# Patient Record
Sex: FEMALE | Race: WHITE | Employment: FULL TIME | URBAN - METROPOLITAN AREA
[De-identification: names, ages, dates, MRNs, and addresses within clinical notes are randomized per-mention and may not be internally consistent; named-entity substitution may affect disease eponyms.]

---

## 2017-03-16 ENCOUNTER — OFFICE VISIT (OUTPATIENT)
Dept: OBGYN CLINIC | Facility: CLINIC | Age: 57
End: 2017-03-16

## 2017-03-16 VITALS
HEART RATE: 63 BPM | WEIGHT: 101.81 LBS | SYSTOLIC BLOOD PRESSURE: 112 MMHG | DIASTOLIC BLOOD PRESSURE: 61 MMHG | BODY MASS INDEX: 19 KG/M2

## 2017-03-16 DIAGNOSIS — Z12.4 SCREENING FOR MALIGNANT NEOPLASM OF CERVIX: ICD-10-CM

## 2017-03-16 DIAGNOSIS — Z85.3 HISTORY OF BREAST CANCER: ICD-10-CM

## 2017-03-16 DIAGNOSIS — Z01.419 ENCOUNTER FOR GYNECOLOGICAL EXAMINATION: Primary | ICD-10-CM

## 2017-03-16 PROCEDURE — 99396 PREV VISIT EST AGE 40-64: CPT | Performed by: OBSTETRICS & GYNECOLOGY

## 2017-03-16 NOTE — PROGRESS NOTES
Brittney Egan is a 49year old female  No LMP recorded. Patient is not currently having periods (Reason: Chemo). here for annual exam.       Last seen 14. Last pap 2014 normal with neg HPV. Seen Newton Rocha in .     Was diagnosed with left b fatigue, night sweats, hot flashes  Eyes:  denies blurred or double vision  Cardiovascular:  denies chest pain or palpitations  Respiratory:  denies shortness of breath  Gastrointestinal:  denies heartburn, abdominal pain, diarrhea or constipation  Genitou exams encouraged. RTC 1 year or prn    2.  History of breast cancer  Pt will f/u with oncologist/ imaging      No prescriptions requested or ordered in this encounter      Martell Dailey MD  3/16/2017  12:39 PM

## 2017-03-17 LAB — HPV I/H RISK 1 DNA SPEC QL NAA+PROBE: NEGATIVE

## 2017-06-13 ENCOUNTER — TELEPHONE (OUTPATIENT)
Dept: OBGYN CLINIC | Facility: CLINIC | Age: 57
End: 2017-06-13

## 2017-06-13 NOTE — TELEPHONE ENCOUNTER
Pt requesting mammogram order. Pt saw Maycol Hope in March for annual. Per notes, pt was following up with rad/onc for mammograms d/t hx of breast cancer. Pt states she spoke to her oncologist and was told it's \"OK for ANDERK to order a basim diagnostic 3D mammo\".

## 2017-06-14 NOTE — TELEPHONE ENCOUNTER
Message below states pt returning call leave a voice message at [phone]. LM at [phone]. LM that  [facility] stated the below. [facility] does not feel comfortable giving order for mammogram since she may also need breast MRI.   Informed pt by leaving the mes

## 2017-06-14 NOTE — TELEPHONE ENCOUNTER
Pt was diagnosed with breast cancer in 2014. Pt was seeing radiation oncologist.  She should be seeing medical oncologist instead. Do not feel comfortable giving order for mammogram since she may also need breast MRI.   If she does not have medical oncolog

## 2017-06-15 ENCOUNTER — TELEPHONE (OUTPATIENT)
Dept: HEMATOLOGY/ONCOLOGY | Facility: HOSPITAL | Age: 57
End: 2017-06-15

## 2017-06-15 NOTE — TELEPHONE ENCOUNTER
Patient just saw Dr. Rodriguez Costa , she did not feel comforatable as to what type of Mammogram should be done so suggested patient have Dr. Manning [facility] order it. If she needs to see him she can make appt.    [phone]    She is thinking about establishing care with

## 2017-07-01 ENCOUNTER — APPOINTMENT (OUTPATIENT)
Dept: RADIATION ONCOLOGY | Facility: HOSPITAL | Age: 57
End: 2017-07-01
Attending: RADIOLOGY
Payer: COMMERCIAL

## 2017-07-05 ENCOUNTER — TELEPHONE (OUTPATIENT)
Dept: HEMATOLOGY/ONCOLOGY | Facility: HOSPITAL | Age: 57
End: 2017-07-05

## 2017-07-21 ENCOUNTER — OFFICE VISIT (OUTPATIENT)
Dept: RADIATION ONCOLOGY | Facility: HOSPITAL | Age: 57
End: 2017-07-21
Attending: RADIOLOGY
Payer: COMMERCIAL

## 2017-07-21 VITALS
RESPIRATION RATE: 16 BRPM | DIASTOLIC BLOOD PRESSURE: 51 MMHG | HEART RATE: 91 BPM | BODY MASS INDEX: 17.29 KG/M2 | SYSTOLIC BLOOD PRESSURE: 116 MMHG | HEIGHT: 62.5 IN | WEIGHT: 96.38 LBS

## 2017-07-21 DIAGNOSIS — C50.412 MALIGNANT NEOPLASM OF UPPER-OUTER QUADRANT OF LEFT BREAST IN FEMALE, ESTROGEN RECEPTOR POSITIVE (HCC): Primary | ICD-10-CM

## 2017-07-21 DIAGNOSIS — Z17.0 MALIGNANT NEOPLASM OF UPPER-OUTER QUADRANT OF LEFT BREAST IN FEMALE, ESTROGEN RECEPTOR POSITIVE (HCC): Primary | ICD-10-CM

## 2017-07-21 PROCEDURE — 99211 OFF/OP EST MAY X REQ PHY/QHP: CPT

## 2017-07-21 NOTE — PROGRESS NOTES
Pt seen in follow up with Dr dAi Lucia, having completed radiation to the L breast 9/4/15. Pt had been following at [facility] and prefers to have all follow up at [address] [place] [address].  Pt to make an appointment with Dr Tonio Joseph for 1st available as pt stopped her tamoxifen almost 2 ye

## 2017-07-27 NOTE — PROGRESS NOTES
[facility]    PATIENT'S NAME: Saud Bauer   RADIATION ONCOLOGIST: Josi Hebert.  Carolene Peabody, MD   PATIENT ACCOUNT #: [de-identified] LOCATION: [address] [place] [address] RECORD #: [id number] YOB: 1960   FOLLOW-UP DATE: 07/21/2017       RADIATIO and has only some occasional tenderness, which is not particularly substantial.  Her appetite is good, and her energy level is stable. She had been placed on tamoxifen but stopped this medication quite some time ago due to some side effects.       Her most may be able to try an aromatase inhibitor at this point as she may be postmenopausal.  The side effects from this may not be as bad as tamoxifen for her, but I do feel that this would be a worthwhile evaluation.   As for the MRI, which is long since overdue

## 2017-08-07 ENCOUNTER — HOSPITAL ENCOUNTER (OUTPATIENT)
Dept: MAMMOGRAPHY | Facility: HOSPITAL | Age: 57
Discharge: HOME | End: 2017-08-07
Attending: RADIOLOGY
Payer: COMMERCIAL

## 2017-08-07 DIAGNOSIS — C50.412 MALIGNANT NEOPLASM OF UPPER-OUTER QUADRANT OF LEFT BREAST IN FEMALE, ESTROGEN RECEPTOR POSITIVE (HCC): ICD-10-CM

## 2017-08-07 DIAGNOSIS — Z17.0 MALIGNANT NEOPLASM OF UPPER-OUTER QUADRANT OF LEFT BREAST IN FEMALE, ESTROGEN RECEPTOR POSITIVE (HCC): ICD-10-CM

## 2017-08-07 PROCEDURE — 77066 DX MAMMO INCL CAD BI: CPT | Performed by: RADIOLOGY

## 2017-08-07 PROCEDURE — 77062 BREAST TOMOSYNTHESIS BI: CPT | Performed by: RADIOLOGY

## 2017-08-10 ENCOUNTER — APPOINTMENT (OUTPATIENT)
Dept: HEMATOLOGY/ONCOLOGY | Facility: HOSPITAL | Age: 57
End: 2017-08-10
Payer: COMMERCIAL

## 2017-08-29 ENCOUNTER — OFFICE VISIT (OUTPATIENT)
Dept: HEMATOLOGY/ONCOLOGY | Facility: HOSPITAL | Age: 57
End: 2017-08-29
Attending: INTERNAL MEDICINE
Payer: COMMERCIAL

## 2017-08-29 VITALS
TEMPERATURE: 99 F | HEART RATE: 66 BPM | DIASTOLIC BLOOD PRESSURE: 64 MMHG | BODY MASS INDEX: 18.12 KG/M2 | WEIGHT: 101 LBS | SYSTOLIC BLOOD PRESSURE: 113 MMHG | RESPIRATION RATE: 16 BRPM | HEIGHT: 62.5 IN

## 2017-08-29 DIAGNOSIS — C50.012 MALIGNANT NEOPLASM OF NIPPLE OF LEFT BREAST IN FEMALE, ESTROGEN RECEPTOR POSITIVE (HCC): Primary | ICD-10-CM

## 2017-08-29 DIAGNOSIS — Z17.0 MALIGNANT NEOPLASM OF NIPPLE OF LEFT BREAST IN FEMALE, ESTROGEN RECEPTOR POSITIVE (HCC): Primary | ICD-10-CM

## 2017-08-29 DIAGNOSIS — Z78.0 POST-MENOPAUSAL: ICD-10-CM

## 2017-08-29 PROCEDURE — 99215 OFFICE O/P EST HI 40 MIN: CPT | Performed by: INTERNAL MEDICINE

## 2017-08-29 PROCEDURE — 99212 OFFICE O/P EST SF 10 MIN: CPT | Performed by: INTERNAL MEDICINE

## 2017-08-29 RX ORDER — ANASTROZOLE 1 MG/1
1 TABLET ORAL DAILY
Qty: 30 TABLET | Refills: 6 | Status: SHIPPED | OUTPATIENT
Start: 2017-08-29 | End: 2019-01-07

## 2017-08-29 NOTE — PROGRESS NOTES
HPI    Tory Cody is a 49year old here for follow up of Malignant neoplasm of nipple of left breast in female, estrogen receptor positive (hcc)  (primary encounter diagnosis)    57y/o diagnosed L breast cancer.  Dr Adrian Meek, the pt's gynecologist palpated Neurological: Negative for dizziness, tremors, seizures, syncope, speech difficulty, weakness, light-headedness, numbness and headaches. Hematological: Negative for adenopathy. Does not bruise/bleed easily.    Psychiatric/Behavioral: Positive for sleep di • Cancer Paternal Grandfather 79     lung ca, smoker; d.75   • Breast Cancer Self 54         PHYSICAL EXAM:    /64 (BP Location: Left arm, Patient Position: Sitting, Cuff Size: adult)   Pulse 66   Temp 99 °F (37.2 °C) (Oral)   Resp 16   Ht 1.588 m (5 Neurological: She is alert and oriented to person, place, and time. She has normal reflexes. No cranial nerve deficit. Skin: Skin is warm and dry. No rash noted. She is not diaphoretic. No erythema. No pallor.    Psychiatric: She has a normal mood and aff No results found for this or any previous visit (from the past 48 hour(s)). Meds This Visit:        Imaging & Referrals:  None   No orders of the defined types were placed in this encounter.       PROCEDURE:            KOREY LAZARA 2D+3D DIAG KOREY W/CAD BILAT Dictated by (CST): Mitchell Dunham MD on 8/07/2017 at 10:35       Approved by (CST): Mitchell Dunham MD on 8/07/2017 at 10:49          =====  CONCLUSION:       BI-RADS CATEGORY:     DIAGNOSTIC CATEGORY 2--BENIGN FINDING:

## 2017-08-29 NOTE — PATIENT INSTRUCTIONS
Anastrozole tablets  What is this medicine? ANASTROZOLE (an AS troe zole) is used to treat breast cancer in women who have gone through menopause.  Some types of breast cancer depend on estrogen to grow, and this medicine can stop tumor growth by blockin Store at room temperature between 20 and 25 degrees C (68 and 77 degrees F). Throw away any unused medicine after the expiration date. What should I tell my health care provider before I take this medicine?   They need to know if you have any of these cond

## 2017-09-12 ENCOUNTER — HOSPITAL ENCOUNTER (OUTPATIENT)
Dept: BONE DENSITY | Age: 57
Discharge: HOME | End: 2017-09-12
Attending: INTERNAL MEDICINE
Payer: COMMERCIAL

## 2017-09-12 DIAGNOSIS — Z78.0 POST-MENOPAUSAL: ICD-10-CM

## 2017-09-12 PROCEDURE — 77080 DXA BONE DENSITY AXIAL: CPT | Performed by: INTERNAL MEDICINE

## 2017-09-19 ENCOUNTER — PATIENT MESSAGE (OUTPATIENT)
Dept: HEMATOLOGY/ONCOLOGY | Facility: HOSPITAL | Age: 57
End: 2017-09-19

## 2017-09-19 DIAGNOSIS — M85.89 OSTEOPENIA OF MULTIPLE SITES: Primary | ICD-10-CM

## 2017-09-20 PROBLEM — M85.89 OSTEOPENIA OF MULTIPLE SITES: Status: ACTIVE | Noted: 2017-09-20

## 2017-09-20 NOTE — TELEPHONE ENCOUNTER
From: Rachel Ambrocio  To: Dragan Lewis MD  Sent: 9/19/2017 6:27 PM CDT  Subject: Test Results Question    I had a bone density test on 9/12/17 and I was wondering when the results will be available.

## 2018-02-27 ENCOUNTER — APPOINTMENT (OUTPATIENT)
Dept: HEMATOLOGY/ONCOLOGY | Facility: HOSPITAL | Age: 58
End: 2018-02-27
Attending: INTERNAL MEDICINE
Payer: COMMERCIAL

## 2018-06-19 DIAGNOSIS — Z80.3 FAMILY HISTORY OF MALIGNANT NEOPLASM OF BREAST: ICD-10-CM

## 2018-06-19 DIAGNOSIS — Z17.0 MALIGNANT NEOPLASM OF NIPPLE OF LEFT BREAST IN FEMALE, ESTROGEN RECEPTOR POSITIVE (HCC): Primary | ICD-10-CM

## 2018-06-19 DIAGNOSIS — C50.012 MALIGNANT NEOPLASM OF NIPPLE OF LEFT BREAST IN FEMALE, ESTROGEN RECEPTOR POSITIVE (HCC): Primary | ICD-10-CM

## 2018-08-14 ENCOUNTER — HOSPITAL ENCOUNTER (OUTPATIENT)
Dept: MAMMOGRAPHY | Facility: HOSPITAL | Age: 58
Discharge: HOME | End: 2018-08-14
Attending: INTERNAL MEDICINE
Payer: COMMERCIAL

## 2018-08-14 ENCOUNTER — HOSPITAL ENCOUNTER (OUTPATIENT)
Dept: MAMMOGRAPHY | Facility: HOSPITAL | Age: 58
Discharge: HOME | End: 2018-08-14
Attending: RADIOLOGY
Payer: COMMERCIAL

## 2018-08-14 DIAGNOSIS — C50.012 MALIGNANT NEOPLASM OF NIPPLE OF LEFT BREAST IN FEMALE, ESTROGEN RECEPTOR POSITIVE (HCC): ICD-10-CM

## 2018-08-14 DIAGNOSIS — Z17.0 MALIGNANT NEOPLASM OF NIPPLE OF LEFT BREAST IN FEMALE, ESTROGEN RECEPTOR POSITIVE (HCC): ICD-10-CM

## 2018-08-14 DIAGNOSIS — Z17.0 MALIGNANT NEOPLASM OF NIPPLE OF LEFT BREAST IN FEMALE, ESTROGEN RECEPTOR POSITIVE (HCC): Primary | ICD-10-CM

## 2018-08-14 DIAGNOSIS — Z80.3 FAMILY HISTORY OF MALIGNANT NEOPLASM OF BREAST: ICD-10-CM

## 2018-08-14 DIAGNOSIS — C50.012 MALIGNANT NEOPLASM OF NIPPLE OF LEFT BREAST IN FEMALE, ESTROGEN RECEPTOR POSITIVE (HCC): Primary | ICD-10-CM

## 2018-08-14 PROCEDURE — 77062 BREAST TOMOSYNTHESIS BI: CPT | Performed by: INTERNAL MEDICINE

## 2018-08-14 PROCEDURE — 77066 DX MAMMO INCL CAD BI: CPT | Performed by: INTERNAL MEDICINE

## 2018-08-14 PROCEDURE — 76642 ULTRASOUND BREAST LIMITED: CPT | Performed by: INTERNAL MEDICINE

## 2018-08-16 ENCOUNTER — OFFICE VISIT (OUTPATIENT)
Dept: RADIATION ONCOLOGY | Facility: HOSPITAL | Age: 58
End: 2018-08-16
Attending: RADIOLOGY
Payer: COMMERCIAL

## 2018-08-16 VITALS
TEMPERATURE: 98 F | HEIGHT: 63 IN | BODY MASS INDEX: 18.18 KG/M2 | RESPIRATION RATE: 16 BRPM | SYSTOLIC BLOOD PRESSURE: 122 MMHG | WEIGHT: 102.63 LBS | DIASTOLIC BLOOD PRESSURE: 55 MMHG | HEART RATE: 62 BPM

## 2018-08-16 DIAGNOSIS — C50.412 MALIGNANT NEOPLASM OF UPPER-OUTER QUADRANT OF LEFT BREAST IN FEMALE, ESTROGEN RECEPTOR POSITIVE (HCC): Primary | ICD-10-CM

## 2018-08-16 DIAGNOSIS — Z17.0 MALIGNANT NEOPLASM OF UPPER-OUTER QUADRANT OF LEFT BREAST IN FEMALE, ESTROGEN RECEPTOR POSITIVE (HCC): Primary | ICD-10-CM

## 2018-08-16 PROCEDURE — 99211 OFF/OP EST MAY X REQ PHY/QHP: CPT

## 2018-08-16 NOTE — PROGRESS NOTES
Pt seen in annual follow up with Dr Ganesh Mendoza, having completed radiation to the L breast 9/4/15. Bilateral mammogram and US from 8/14/18 available for MD review.  Pt states she will no longer follow with Dr Danny Suarez, and is requesting a new medical oncologist.

## 2018-08-20 ENCOUNTER — TELEPHONE (OUTPATIENT)
Dept: HEMATOLOGY/ONCOLOGY | Facility: HOSPITAL | Age: 58
End: 2018-08-20

## 2018-08-20 NOTE — TELEPHONE ENCOUNTER
----- Message from Joey Kennedy RN sent at 8/20/2018 11:40 AM CDT -----  Please call pt and schedule her yearly f/u with Dr. Allison Stubbs.     Fernando Ellis  ----- Message -----  From: Pippa Metcalf MD  Sent: 8/20/2018   2:22 AM  To: Joey Kennedy RN    Patient needs

## 2018-08-27 ENCOUNTER — TELEPHONE (OUTPATIENT)
Dept: HEMATOLOGY/ONCOLOGY | Facility: HOSPITAL | Age: 58
End: 2018-08-27

## 2018-08-27 NOTE — TELEPHONE ENCOUNTER
Left a message asking the patient to call back and schedule yearly follow up or to let us know if she is seeing another physician some where else.

## 2018-08-28 NOTE — TELEPHONE ENCOUNTER
Bautista Douglas stated, she is not interested in scheduling with Dr Nina Miller. Bautista Douglas is seeing another hematologist here and will call us back to schedule.

## 2018-08-28 NOTE — PROGRESS NOTES
[facility]    PATIENT'S NAME: Gage Pablo   RADIATION ONCOLOGIST: Randall Eli.  Alcon Flores MD   PATIENT ACCOUNT #: [de-identified] LOCATION: [address] [place] [address] RECORD #: [id number] YOB: 1960   FOLLOW-UP DATE: 08/16/2018       RADIATIO having a lot of difficulties and stress due to having lost her job and some family disputes, but she apparently is in a better frame of mind at this point.   She had been given anastrozole from Dr. Gladys Zapata but never started this medication and feels that she [facility] you very much for allowing me the opportunity to participate in the care of this patient. If there should be questions regarding the radiotherapy, please feel free to contact me at any time. Dictated By Violeta Forbes MD  d: 08/28/2018 11:04:22  t

## 2018-09-12 ENCOUNTER — APPOINTMENT (OUTPATIENT)
Dept: HEMATOLOGY/ONCOLOGY | Facility: HOSPITAL | Age: 58
End: 2018-09-12
Attending: INTERNAL MEDICINE
Payer: COMMERCIAL

## 2018-09-19 ENCOUNTER — APPOINTMENT (OUTPATIENT)
Dept: HEMATOLOGY/ONCOLOGY | Facility: HOSPITAL | Age: 58
End: 2018-09-19
Attending: INTERNAL MEDICINE
Payer: COMMERCIAL

## 2018-10-03 ENCOUNTER — OFFICE VISIT (OUTPATIENT)
Dept: HEMATOLOGY/ONCOLOGY | Facility: HOSPITAL | Age: 58
End: 2018-10-03
Attending: INTERNAL MEDICINE
Payer: COMMERCIAL

## 2018-10-03 VITALS
WEIGHT: 102 LBS | BODY MASS INDEX: 18.07 KG/M2 | SYSTOLIC BLOOD PRESSURE: 114 MMHG | HEIGHT: 63 IN | HEART RATE: 65 BPM | DIASTOLIC BLOOD PRESSURE: 56 MMHG | TEMPERATURE: 99 F | RESPIRATION RATE: 16 BRPM

## 2018-10-03 DIAGNOSIS — Z78.0 ASYMPTOMATIC MENOPAUSAL STATE: Primary | ICD-10-CM

## 2018-10-03 DIAGNOSIS — M85.89 OSTEOPENIA OF MULTIPLE SITES: ICD-10-CM

## 2018-10-03 DIAGNOSIS — Z85.3 HISTORY OF BREAST CANCER: ICD-10-CM

## 2018-10-03 PROCEDURE — 99215 OFFICE O/P EST HI 40 MIN: CPT | Performed by: INTERNAL MEDICINE

## 2018-10-31 ENCOUNTER — TELEPHONE (OUTPATIENT)
Dept: HEMATOLOGY/ONCOLOGY | Facility: HOSPITAL | Age: 58
End: 2018-10-31

## 2018-10-31 NOTE — TELEPHONE ENCOUNTER
I attempted to reach Caridad Koehler. No answer. I left a voice mail message to please return my call so I may do a telephone assessment.

## 2018-10-31 NOTE — TELEPHONE ENCOUNTER
Dom Artist calling to inform Dr Stephanie Sclaes she started her anastrozole. She was having side effects from medication she was having heart palpitations 24 hrs a day, disoriented got lost going to work twice, headaches. She has patel parkinsons disease.  Patient has been

## 2018-11-01 NOTE — TELEPHONE ENCOUNTER
I made a second attempt to reach Romie Oliviaanjum regarding her sick call yesterday. No answer. I left a  msg asking her to please return my call.

## 2018-11-06 ENCOUNTER — TELEPHONE (OUTPATIENT)
Dept: HEMATOLOGY/ONCOLOGY | Facility: HOSPITAL | Age: 58
End: 2018-11-06

## 2018-11-06 NOTE — TELEPHONE ENCOUNTER
Letitia Cochran returned my call. She apologized for not being able to return my call sooner. She reports her symptoms of heart palpitations, headache, disorientation have resolved since she stopped the anastrozole.  I updated her that when I spoke with Constantine Espinal, she

## 2019-01-07 ENCOUNTER — OFFICE VISIT (OUTPATIENT)
Dept: OBGYN CLINIC | Facility: CLINIC | Age: 59
End: 2019-01-07
Payer: COMMERCIAL

## 2019-01-07 ENCOUNTER — APPOINTMENT (OUTPATIENT)
Dept: LAB | Facility: HOSPITAL | Age: 59
End: 2019-01-07
Attending: OBSTETRICS & GYNECOLOGY
Payer: COMMERCIAL

## 2019-01-07 VITALS
BODY MASS INDEX: 18 KG/M2 | DIASTOLIC BLOOD PRESSURE: 77 MMHG | HEART RATE: 65 BPM | SYSTOLIC BLOOD PRESSURE: 125 MMHG | WEIGHT: 102 LBS

## 2019-01-07 DIAGNOSIS — R10.2 PELVIC PAIN: Primary | ICD-10-CM

## 2019-01-07 DIAGNOSIS — R35.0 URINARY FREQUENCY: ICD-10-CM

## 2019-01-07 LAB
BILIRUB UR QL: NEGATIVE
CLARITY UR: CLEAR
COLOR UR: YELLOW
GLUCOSE UR-MCNC: NEGATIVE MG/DL
KETONES UR-MCNC: NEGATIVE MG/DL
NITRITE UR QL STRIP.AUTO: NEGATIVE
PH UR: 5 [PH] (ref 5–8)
PROT UR-MCNC: NEGATIVE MG/DL
RBC #/AREA URNS AUTO: 1 /HPF
SP GR UR STRIP: 1.01 (ref 1–1.03)
UROBILINOGEN UR STRIP-ACNC: <2
VIT C UR-MCNC: NEGATIVE MG/DL
WBC #/AREA URNS AUTO: 4 /HPF

## 2019-01-07 PROCEDURE — 99214 OFFICE O/P EST MOD 30 MIN: CPT | Performed by: OBSTETRICS & GYNECOLOGY

## 2019-01-07 PROCEDURE — 81001 URINALYSIS AUTO W/SCOPE: CPT

## 2019-01-08 NOTE — PROGRESS NOTES
Pratibha Wilson is a 66year old female  No LMP recorded. Patient is not currently having periods (Reason: Chemo).  Patient presents with:  Gyn Problem: PELVIC PAIN -- JLK/KCB pt -- started Right sided bothersome pain & bloating x 2-3 wks ago ; urinary Sexual activity: Yes    Other Topics      Concerns:         Service: Not Asked        Blood Transfusions: Not Asked        Caffeine Concern: Yes          coffee, 1-2 cups daily        Occupational Exposure: Not Asked        Hobby Hazards: Not Asked no fullness, masses or tenderness  Vagina:    normal appearance without lesions, no abnormal discharge  Cervix:    normal without tenderness on motion  Uterus:   normal in size, contour, position, mobility, without tenderness  Adnexa:   normal without m

## 2019-01-09 ENCOUNTER — TELEPHONE (OUTPATIENT)
Dept: OBGYN CLINIC | Facility: CLINIC | Age: 59
End: 2019-01-09

## 2019-01-09 RX ORDER — SULFAMETHOXAZOLE AND TRIMETHOPRIM 800; 160 MG/1; MG/1
1 TABLET ORAL 2 TIMES DAILY
Qty: 6 TABLET | Refills: 0 | Status: SHIPPED | OUTPATIENT
Start: 2019-01-09 | End: 2019-01-12

## 2019-01-09 NOTE — TELEPHONE ENCOUNTER
UA REVIEWED WITH DIVYA OVER THE PHONE.  SHE ORDERED BACTRIM DS, BID X 3 DAYS. PT SHOULD CALL BACK IN 2 DAYS FOR CULTURE SENSITIVITY TO SEE IF WE NEED TO CHANGE RX.   LEFT MESSAGE FOR PT WITH THIS INFO AND ASKED HER TO CALL BACK RIGHT AWAY IF SHE HAS ANY Torsten Handler

## 2019-01-14 NOTE — TELEPHONE ENCOUNTER
Pt calling for UA Culture results. Pt had UA done with reflex. Called lab to see why it did not reflex to culture. Angelica from the lab stated that the wbcs need to be greater than 5. Sent to [address] [place] [address] for recs. Pt to be called once DIVYA replies.     (On 1/9/

## 2019-01-14 NOTE — TELEPHONE ENCOUNTER
Pt stated her symptoms are improving. Pt advised to call if symptoms return. Pt verbalized understanding.

## 2019-02-05 ENCOUNTER — TELEPHONE (OUTPATIENT)
Dept: OBGYN CLINIC | Facility: CLINIC | Age: 59
End: 2019-02-05

## 2019-02-05 NOTE — TELEPHONE ENCOUNTER
Received US, Pelvic Non OB, B-Scan and/or real time. Dated 1-21-19. Placed on [facility]'S desk.

## 2019-02-12 ENCOUNTER — TELEPHONE (OUTPATIENT)
Dept: OBGYN CLINIC | Facility: CLINIC | Age: 59
End: 2019-02-12

## 2019-02-12 NOTE — TELEPHONE ENCOUNTER
Message to [facility]---See 2/5/19 comm. Pt had pelvic US done at outside facility and results were placed on [facility] desk for review.

## 2019-04-18 ENCOUNTER — OFFICE VISIT (OUTPATIENT)
Dept: INTERNAL MEDICINE CLINIC | Facility: CLINIC | Age: 59
End: 2019-04-18
Payer: COMMERCIAL

## 2019-04-18 VITALS
DIASTOLIC BLOOD PRESSURE: 68 MMHG | TEMPERATURE: 97 F | HEIGHT: 63 IN | BODY MASS INDEX: 18.34 KG/M2 | SYSTOLIC BLOOD PRESSURE: 106 MMHG | HEART RATE: 67 BPM | WEIGHT: 103.5 LBS

## 2019-04-18 DIAGNOSIS — R10.84 GENERALIZED ABDOMINAL PAIN: Primary | ICD-10-CM

## 2019-04-18 DIAGNOSIS — Z12.11 SCREENING FOR COLON CANCER: ICD-10-CM

## 2019-04-18 DIAGNOSIS — K59.00 CONSTIPATION, UNSPECIFIED CONSTIPATION TYPE: ICD-10-CM

## 2019-04-18 PROCEDURE — 99204 OFFICE O/P NEW MOD 45 MIN: CPT | Performed by: INTERNAL MEDICINE

## 2019-04-18 PROCEDURE — 99212 OFFICE O/P EST SF 10 MIN: CPT | Performed by: INTERNAL MEDICINE

## 2019-04-18 RX ORDER — DOCUSATE SODIUM 100 MG/1
100 CAPSULE, LIQUID FILLED ORAL 2 TIMES DAILY
Qty: 20 CAPSULE | Refills: 0 | Status: SHIPPED | OUTPATIENT
Start: 2019-04-18

## 2019-04-18 NOTE — PROGRESS NOTES
Eduarda Bruno is a 66year old female. Patient presents with:  Abdominal Pain: patient c/o constipation      HPI:     HPI    Patient is here for abdominal pain and constipation. Abdominal pain on and off for 3-4 months.  Abdominal pain on the right lower History:   Diagnosis Date   • Breast cancer ([facility] [address] 2015   • Urinary incontinence    • Agapito-Parkinson-White syndrome       Past Surgical History:   Procedure Laterality Date   • Chemotherapy  2015    chemo, pt tried to take tamoxifen,unable to tolerate   • well-developed and well-nourished. Cardiovascular: Normal rate, regular rhythm, normal heart sounds and intact distal pulses. Pulmonary/Chest: Effort normal and breath sounds normal.   Abdominal: Soft.  Bowel sounds are normal. She exhibits no distensio

## 2019-04-29 ENCOUNTER — TELEPHONE (OUTPATIENT)
Dept: INTERNAL MEDICINE CLINIC | Facility: CLINIC | Age: 59
End: 2019-04-29

## 2019-04-29 NOTE — TELEPHONE ENCOUNTER
Pt states she had her CT scan done on Friday at   [address] [address] phone [phone]

## 2019-05-01 NOTE — TELEPHONE ENCOUNTER
Results released in [employer]. Attempted to call. No answer.  [facility]

## 2019-08-16 ENCOUNTER — TELEPHONE (OUTPATIENT)
Dept: OBGYN CLINIC | Facility: CLINIC | Age: 59
End: 2019-08-16

## 2019-08-16 NOTE — TELEPHONE ENCOUNTER
Pt is due for an Annual with [facility]. Last Annual 3-2017. (Pt states that she was diag with breast cancer 2014.)  Pt had a mammogram 8-14-18 with Dr. Yoel Wilson.   Pt states that she will schedule an Annual, but wants to know if you will give an order for an mammo

## 2019-08-19 NOTE — TELEPHONE ENCOUNTER
Last annual in 2017. Can give order at her visit.   Pt should also be followed by oncologist due to h/o breast cancer

## 2019-10-01 ENCOUNTER — OFFICE VISIT (OUTPATIENT)
Dept: OBGYN CLINIC | Facility: CLINIC | Age: 59
End: 2019-10-01
Payer: COMMERCIAL

## 2019-10-01 VITALS
HEART RATE: 59 BPM | BODY MASS INDEX: 18.03 KG/M2 | DIASTOLIC BLOOD PRESSURE: 71 MMHG | WEIGHT: 98 LBS | SYSTOLIC BLOOD PRESSURE: 112 MMHG | HEIGHT: 61.75 IN

## 2019-10-01 DIAGNOSIS — Z85.3 HISTORY OF BREAST CANCER: ICD-10-CM

## 2019-10-01 DIAGNOSIS — Z01.419 ENCOUNTER FOR GYNECOLOGICAL EXAMINATION: Primary | ICD-10-CM

## 2019-10-01 PROCEDURE — 99396 PREV VISIT EST AGE 40-64: CPT | Performed by: OBSTETRICS & GYNECOLOGY

## 2019-10-16 ENCOUNTER — HOSPITAL ENCOUNTER (OUTPATIENT)
Dept: MAMMOGRAPHY | Facility: HOSPITAL | Age: 59
Discharge: HOME | End: 2019-10-16
Attending: OBSTETRICS & GYNECOLOGY
Payer: COMMERCIAL

## 2019-10-16 DIAGNOSIS — Z85.3 HISTORY OF BREAST CANCER: ICD-10-CM

## 2019-10-16 PROCEDURE — 77066 DX MAMMO INCL CAD BI: CPT | Performed by: OBSTETRICS & GYNECOLOGY

## 2019-10-16 PROCEDURE — 77062 BREAST TOMOSYNTHESIS BI: CPT | Performed by: OBSTETRICS & GYNECOLOGY

## 2020-10-06 ENCOUNTER — OFFICE VISIT (OUTPATIENT)
Dept: OBGYN CLINIC | Facility: CLINIC | Age: 60
End: 2020-10-06
Payer: COMMERCIAL

## 2020-10-06 VITALS
WEIGHT: 98 LBS | SYSTOLIC BLOOD PRESSURE: 116 MMHG | DIASTOLIC BLOOD PRESSURE: 76 MMHG | HEART RATE: 62 BPM | BODY MASS INDEX: 18 KG/M2

## 2020-10-06 DIAGNOSIS — Z01.419 ENCOUNTER FOR GYNECOLOGICAL EXAMINATION: Primary | ICD-10-CM

## 2020-10-06 DIAGNOSIS — Z12.4 SCREENING FOR MALIGNANT NEOPLASM OF CERVIX: ICD-10-CM

## 2020-10-06 DIAGNOSIS — Z85.3 HISTORY OF BREAST CANCER: ICD-10-CM

## 2020-10-06 PROCEDURE — 3074F SYST BP LT 130 MM HG: CPT | Performed by: OBSTETRICS & GYNECOLOGY

## 2020-10-06 PROCEDURE — 3078F DIAST BP <80 MM HG: CPT | Performed by: OBSTETRICS & GYNECOLOGY

## 2020-10-06 PROCEDURE — 99396 PREV VISIT EST AGE 40-64: CPT | Performed by: OBSTETRICS & GYNECOLOGY

## 2020-10-06 NOTE — PROGRESS NOTES
Vicki Rocha is a 64year old female  No LMP recorded. (Menstrual status: Chemo). here for annual exam.       Last seen 10/1/19. Last pap 3/2017 normal with neg HPV.     Was diagnosed with left breast cancer in 2014.  Had lumpectomy and had + Paternal Grandfather 79        lung ca, smoker; d.75   • Breast Cancer Self 54   • Diabetes Neg    • Heart Disorder Neg        MEDICATIONS:  Current Outpatient Medications   Medication Sig Dispense Refill   • docusate sodium (COLACE) 100 MG Oral Cap Take 1 without lesions, no abnormal discharge  Cervix:  Normal without tenderness on motion  Uterus: normal in size, contour, position, mobility, without tenderness  Adnexa: normal without masses or tenderness  Perineum/anus: normal      Assessment & Plan:    [facility]

## 2020-10-19 ENCOUNTER — HOSPITAL ENCOUNTER (OUTPATIENT)
Dept: MAMMOGRAPHY | Facility: HOSPITAL | Age: 60
Discharge: HOME | End: 2020-10-19
Attending: OBSTETRICS & GYNECOLOGY
Payer: COMMERCIAL

## 2020-10-19 DIAGNOSIS — Z85.3 HISTORY OF BREAST CANCER: ICD-10-CM

## 2020-10-19 PROCEDURE — 77062 BREAST TOMOSYNTHESIS BI: CPT | Performed by: OBSTETRICS & GYNECOLOGY

## 2020-10-19 PROCEDURE — 77066 DX MAMMO INCL CAD BI: CPT | Performed by: OBSTETRICS & GYNECOLOGY

## 2021-06-10 NOTE — PROGRESS NOTES
Mable Adkins is a 64year old female  No LMP recorded. (Menstrual status: Chemo). here for annual exam.       Last seen 3/16/17. Last pap 3/2017 normal with neg HPV. LMP 2015. Was diagnosed with left breast cancer in 2014.   Had lumpec d.60   • Cancer Paternal Grandfather 79        lung ca, smoker; d.75   • Breast Cancer Self 54   • Diabetes Neg    • Heart Disorder Neg        MEDICATIONS:    Current Outpatient Medications:  docusate sodium (COLACE) 100 MG Oral Cap Take 1 capsule (100 mg tenderness  Vagina:  Normal appearance without lesions, no abnormal discharge  Cervix:  Normal without tenderness on motion  Uterus: normal in size, contour, position, mobility, without tenderness  Adnexa: normal without masses or tenderness  Perineum/anus Initial (On Arrival)

## 2021-11-03 ENCOUNTER — OFFICE VISIT (OUTPATIENT)
Dept: OBGYN CLINIC | Facility: CLINIC | Age: 61
End: 2021-11-03
Payer: COMMERCIAL

## 2021-11-03 VITALS
DIASTOLIC BLOOD PRESSURE: 77 MMHG | SYSTOLIC BLOOD PRESSURE: 122 MMHG | BODY MASS INDEX: 18 KG/M2 | HEART RATE: 64 BPM | WEIGHT: 99 LBS

## 2021-11-03 DIAGNOSIS — Z01.419 WELL WOMAN EXAM: Primary | ICD-10-CM

## 2021-11-03 DIAGNOSIS — Z85.3 HISTORY OF BREAST CANCER: ICD-10-CM

## 2021-11-03 PROCEDURE — 99396 PREV VISIT EST AGE 40-64: CPT | Performed by: OBSTETRICS & GYNECOLOGY

## 2021-11-03 PROCEDURE — 3078F DIAST BP <80 MM HG: CPT | Performed by: OBSTETRICS & GYNECOLOGY

## 2021-11-03 PROCEDURE — 3074F SYST BP LT 130 MM HG: CPT | Performed by: OBSTETRICS & GYNECOLOGY

## 2021-11-03 NOTE — H&P
HPI:  The patient is a 64 yo F with history of breast CA here for WWE. NO PMB. Needs mammo rx. Doesn't follow with med onc. Breast CA in 2014. Had lumpectomy with chemo and radiation.   Was placed on tamoxifen and then anastrazole due to severe menop Caffeine Concern: Yes          coffee, 1-2 cups daily        Occupational Exposure: Not Asked        Hobby Hazards: Not Asked        Sleep Concern: Not Asked        Stress Concern: Not Asked        Weight Concern: Not Asked        Special Diet: Not Asked Cancer Paternal Aunt         unk age, ovarian and breastca   • Ovarian Cancer Paternal Aunt         unk age, ovarian and breast ca   • Cancer Paternal Uncle 59        Prostate CA   • Cancer Maternal Grandfather 62        colon ca; d.58   • Cancer Paternal lesions and prolapse  Bladder:  No fullness, masses or tenderness  Vagina:  Normal appearance without lesions, no abnormal discharge  Cervix:  Normal without tenderness on motion  Uterus: normal in size, contour, position, mobility, without tenderness  Adn

## 2021-11-17 ENCOUNTER — HOSPITAL ENCOUNTER (OUTPATIENT)
Dept: MAMMOGRAPHY | Facility: HOSPITAL | Age: 61
Discharge: HOME | End: 2021-11-17
Attending: OBSTETRICS & GYNECOLOGY
Payer: COMMERCIAL

## 2021-11-17 DIAGNOSIS — Z85.3 HISTORY OF BREAST CANCER: ICD-10-CM

## 2021-11-17 PROCEDURE — 77066 DX MAMMO INCL CAD BI: CPT | Performed by: OBSTETRICS & GYNECOLOGY

## 2021-11-17 PROCEDURE — 77062 BREAST TOMOSYNTHESIS BI: CPT | Performed by: OBSTETRICS & GYNECOLOGY

## 2021-12-01 ENCOUNTER — TELEPHONE (OUTPATIENT)
Dept: OBGYN CLINIC | Facility: CLINIC | Age: 61
End: 2021-12-01

## 2021-12-01 NOTE — TELEPHONE ENCOUNTER
----- Message from Brandon Campo DO sent at 11/26/2021  5:36 PM CST -----  Pls notify of results and encourage pt to f/u with Dr Chano Salcedo for surveillance recs. Pt with history of breast cancer.   Did chemo/radiation and didn't continue post treatment meds

## 2021-12-01 NOTE — TELEPHONE ENCOUNTER
Informed pt of the mammogram results below. Informed pt that Lucia Thompson wanted her to know the results of the mammogram and encourage her to f/u with Dr. James Zhang for surveillance recs. Pt stated understanding and will call Dr. James Zhang.  Pt given phone number to call

## 2021-12-02 ENCOUNTER — TELEPHONE (OUTPATIENT)
Dept: HEMATOLOGY/ONCOLOGY | Facility: HOSPITAL | Age: 61
End: 2021-12-02

## 2021-12-02 NOTE — TELEPHONE ENCOUNTER
Patient last saw Dr Damien Rocha in 2018 (patient also saw Dr Karis Keller but she wants to see Dr Damien Rocha going forward). Dr Deedee Atkins is referring she come back to be seen for an abnormal mammogram. Dr Jacobs Camera office reached out to get her an appointment scheduled.  Please provide a date & time for this patient

## 2021-12-02 NOTE — TELEPHONE ENCOUNTER
Spoke with scheduling at Dr. Frank Ortiz office.  apologized, states patient has seen Dr. James Zhang in the past, and is fine to schedule with her again. They will reach out to patient to schedule appt. Patient notified to await their call.  Patient Miguel Walkeron

## 2021-12-02 NOTE — TELEPHONE ENCOUNTER
Pt is calling said the oncology department refused to book her appt with Dr Marta Galvan. They said cause she had seen a different DR before . Pt wants to see DR Marta Galvan who Dr Lisy Suazo referred pt to.  Pt is confused and upset ,

## 2022-01-31 ENCOUNTER — TELEPHONE (OUTPATIENT)
Dept: HEMATOLOGY/ONCOLOGY | Facility: HOSPITAL | Age: 62
End: 2022-01-31

## 2022-01-31 NOTE — TELEPHONE ENCOUNTER
Patient have a family emergency out of town her father not doing well, she is cancelling her appointment on 2/1/22 at 65 PM until she take care of her Father out of state and she will reschedule when she return.

## 2022-02-01 ENCOUNTER — APPOINTMENT (OUTPATIENT)
Dept: HEMATOLOGY/ONCOLOGY | Facility: HOSPITAL | Age: 62
End: 2022-02-01
Attending: INTERNAL MEDICINE
Payer: COMMERCIAL

## 2022-02-04 NOTE — TELEPHONE ENCOUNTER
JERRYTCMARYELLEN. Pt had an appt with Dr. Daija Multani for 2/1 and did not have the appt. Need to know if pt is going to schedule an appt with Dr. Daija Multani.

## 2022-02-10 ENCOUNTER — TELEPHONE (OUTPATIENT)
Dept: OBGYN CLINIC | Facility: CLINIC | Age: 62
End: 2022-02-10

## 2022-02-10 NOTE — TELEPHONE ENCOUNTER
Letter and certified letter sent to the pt. Pt was suppose to have an appt with Dr. Megan Ramirez for 2/1 and did not have the appt with Dr. Megan Ramirez. Need to know if pt is going to schedule an appt with Dr. Megan Ramirez.

## 2022-02-21 ENCOUNTER — TELEPHONE (OUTPATIENT)
Dept: OBGYN CLINIC | Facility: CLINIC | Age: 62
End: 2022-02-21

## 2022-03-18 ENCOUNTER — OFFICE VISIT (OUTPATIENT)
Dept: HEMATOLOGY/ONCOLOGY | Facility: HOSPITAL | Age: 62
End: 2022-03-18
Attending: INTERNAL MEDICINE
Payer: COMMERCIAL

## 2022-03-18 VITALS
HEIGHT: 61.75 IN | TEMPERATURE: 98 F | OXYGEN SATURATION: 99 % | BODY MASS INDEX: 17.88 KG/M2 | HEART RATE: 61 BPM | RESPIRATION RATE: 16 BRPM | DIASTOLIC BLOOD PRESSURE: 39 MMHG | SYSTOLIC BLOOD PRESSURE: 108 MMHG | WEIGHT: 97.19 LBS

## 2022-03-18 DIAGNOSIS — Z12.31 SCREENING MAMMOGRAM, ENCOUNTER FOR: ICD-10-CM

## 2022-03-18 DIAGNOSIS — Z08 ENCOUNTER FOR FOLLOW-UP SURVEILLANCE OF BREAST CANCER: ICD-10-CM

## 2022-03-18 DIAGNOSIS — Z85.3 ENCOUNTER FOR FOLLOW-UP SURVEILLANCE OF BREAST CANCER: ICD-10-CM

## 2022-03-18 DIAGNOSIS — Z85.3 HISTORY OF LEFT BREAST CANCER: Primary | ICD-10-CM

## 2022-03-18 PROCEDURE — 99203 OFFICE O/P NEW LOW 30 MIN: CPT | Performed by: INTERNAL MEDICINE

## 2022-12-03 ENCOUNTER — HOSPITAL ENCOUNTER (OUTPATIENT)
Dept: MAMMOGRAPHY | Facility: HOSPITAL | Age: 62
Discharge: HOME | End: 2022-12-03
Attending: INTERNAL MEDICINE
Payer: COMMERCIAL

## 2022-12-03 DIAGNOSIS — Z12.31 SCREENING MAMMOGRAM, ENCOUNTER FOR: ICD-10-CM

## 2022-12-03 PROCEDURE — 77067 SCR MAMMO BI INCL CAD: CPT | Performed by: INTERNAL MEDICINE

## 2022-12-03 PROCEDURE — 77063 BREAST TOMOSYNTHESIS BI: CPT | Performed by: INTERNAL MEDICINE

## 2023-03-21 ENCOUNTER — APPOINTMENT (OUTPATIENT)
Dept: HEMATOLOGY/ONCOLOGY | Facility: HOSPITAL | Age: 63
End: 2023-03-21
Attending: INTERNAL MEDICINE
Payer: COMMERCIAL

## 2023-04-11 ENCOUNTER — APPOINTMENT (OUTPATIENT)
Dept: HEMATOLOGY/ONCOLOGY | Facility: HOSPITAL | Age: 63
End: 2023-04-11
Attending: INTERNAL MEDICINE
Payer: COMMERCIAL

## 2023-08-29 ENCOUNTER — OFFICE VISIT (OUTPATIENT)
Dept: SURGERY | Facility: CLINIC | Age: 63
End: 2023-08-29

## 2023-08-29 DIAGNOSIS — N39.41 URGENCY INCONTINENCE: Primary | ICD-10-CM

## 2023-08-29 PROCEDURE — 99205 OFFICE O/P NEW HI 60 MIN: CPT | Performed by: UROLOGY

## 2023-08-29 PROCEDURE — 51798 US URINE CAPACITY MEASURE: CPT | Performed by: UROLOGY

## 2023-09-05 ENCOUNTER — TELEPHONE (OUTPATIENT)
Dept: SURGERY | Facility: CLINIC | Age: 63
End: 2023-09-05

## 2023-09-05 DIAGNOSIS — N39.41 URGENCY INCONTINENCE: Primary | ICD-10-CM

## 2023-09-05 NOTE — TELEPHONE ENCOUNTER
Patient states she was seen 08/29 and was told medication would be prescribed. States nothing has been sent to pharmacy.  Please advise

## 2023-09-06 NOTE — TELEPHONE ENCOUNTER
Please advise, no rx was sent on the day of her visit.        PLAN:  UA reflex to culture  RTC 3 months  Mirabegron 50mg daily

## 2023-09-13 NOTE — TELEPHONE ENCOUNTER
- LM on pt VM stating that rx was sent to her pharmacy, also sent a [facility].   Left call back number of [phone] if she has any further questions

## 2023-09-20 NOTE — TELEPHONE ENCOUNTER
- checked with [facility] to see if rx had been picked up yet, and they stated no, it had been put back on the shelf as it was filled on 9/6/23.  - LM on pt identified VM that the Rx had been filled at [facility], but that it was put back on the shelf due to length of time since it was filled, explained to her that if she does wish to have it, she should contact [facility] to have them fill it again.

## 2023-11-28 ENCOUNTER — OFFICE VISIT (OUTPATIENT)
Dept: OBGYN CLINIC | Facility: CLINIC | Age: 63
End: 2023-11-28
Payer: COMMERCIAL

## 2023-11-28 VITALS
HEART RATE: 71 BPM | WEIGHT: 95 LBS | DIASTOLIC BLOOD PRESSURE: 74 MMHG | BODY MASS INDEX: 18 KG/M2 | SYSTOLIC BLOOD PRESSURE: 116 MMHG

## 2023-11-28 DIAGNOSIS — Z12.4 CERVICAL CANCER SCREENING: ICD-10-CM

## 2023-11-28 DIAGNOSIS — Z01.419 WELL WOMAN EXAM: Primary | ICD-10-CM

## 2023-11-28 DIAGNOSIS — Z85.3 HISTORY OF BREAST CANCER: ICD-10-CM

## 2023-11-28 PROCEDURE — 99396 PREV VISIT EST AGE 40-64: CPT | Performed by: OBSTETRICS & GYNECOLOGY

## 2023-11-28 PROCEDURE — 3074F SYST BP LT 130 MM HG: CPT | Performed by: OBSTETRICS & GYNECOLOGY

## 2023-11-28 PROCEDURE — 3078F DIAST BP <80 MM HG: CPT | Performed by: OBSTETRICS & GYNECOLOGY

## 2023-11-28 NOTE — PROGRESS NOTES
HPI:  The patient is a 65 yo F here for WWE. NO PMB. /monogamous. NO colonoscopy done. H/o breast cancer. Had consult with Dr Mayur Lee in  for surveillance discussion    No LMP recorded. (Menstrual status: Chemo). Latest Ref Rng & Units 10/6/2020     1:30 PM 3/16/2017    12:54 PM   RECENT PAP RESULTS   Thinprep Pap Negative for intraepithelial lesion or malignancy Negative for intraepithelial lesion or malignancy  Negative for intraepithelial lesion or malignancy    HPV Negative Negative  Negative         Hx Prior Abnormal Pap: No  Pap Date: 10/06/20  Pap Result Notes: Normal Pap, neg HPV. .        Depression Screening (PHQ-2/PHQ-9): Over the LAST 2 WEEKS   Little interest or pleasure in doing things (over the last two weeks)?: Not at all    Feeling down, depressed, or hopeless (over the last two weeks)?: Not at all    PHQ-2 SCORE: 0          Reviewed medical and surgical history below       OBSTETRICS HISTORY:  OB History    Para Term  AB Living   1 1 1 0 0 2   SAB IAB Ectopic Multiple Live Births   0 0 0 1 2       GYNE HISTORY:  History   Sexual Activity    Sexual activity: Yes    Partners: Male            MEDICAL HISTORY:  Past Medical History:   Diagnosis Date    Breast cancer ([facility] [address]     Gall stones     Urinary incontinence     Agapito-Parkinson-White syndrome        SURGICAL HISTORY:  Past Surgical History:   Procedure Laterality Date    CHEMOTHERAPY      chemo, pt tried to take tamoxifen,unable to tolerate    INGUINAL HERNIA REPAIR Bilateral 1963    LUMPECTOMY LEFT  1-28-15    with SLNB    NEEDLE BIOPSY LEFT  2014    benign u/s guided    NEEDLE BIOPSY RIGHT  2014    benign MRI BX    OTHER SURGICAL HISTORY      laparascopic bilateral salpinectomy    PELVIS LAPAROSCOPY,DX      RADIATION LEFT      TONSILLECTOMY  1965       SOCIAL HISTORY:  Social History     Socioeconomic History    Marital status:      Spouse name: Not on file    Number of children: 2 Years of education: Not on file    Highest education level: Not on file   Occupational History    Occupation: , administrative   Tobacco Use    Smoking status: Never    Smokeless tobacco: Never   Vaping Use    Vaping Use: Never used   Substance and Sexual Activity    Alcohol use: Yes     Alcohol/week: 0.0 standard drinks of alcohol     Comment: 1/week    Drug use: No    Sexual activity: Yes     Partners: Male   Other Topics Concern     Service Not Asked    Blood Transfusions Not Asked    Caffeine Concern Yes     Comment: coffee, 1-2 cups daily    Occupational Exposure Not Asked    Hobby Hazards Not Asked    Sleep Concern Not Asked    Stress Concern Not Asked    Weight Concern Not Asked    Special Diet Not Asked    Back Care Not Asked    Exercise Not Asked    Bike Helmet Not Asked    Seat Belt Not Asked    Self-Exams Not Asked   Social History Narrative    Not on file     Social Determinants of Health     Financial Resource Strain: Not on file   Food Insecurity: Not on file   Transportation Needs: Not on file   Physical Activity: Not on file   Stress: Not on file   Social Connections: Not on file   Housing Stability: Not on file       FAMILY HISTORY:  Family History   Problem Relation Age of Onset    Cancer Father 58        Prostate CA    Hypertension Father     Breast Cancer Mother 79    Cancer Paternal Aunt 28        uterine ca; d.68    Breast Cancer Paternal Aunt         unk age, ovarian and breastca    Ovarian Cancer Paternal Aunt         unk age, ovarian and breast ca    Cancer Paternal Uncle 59        Prostate CA    Cancer Maternal Grandfather 62        colon ca; d.58    Cancer Paternal Grandfather 79        lung ca, smoker; d.75    Breast Cancer Self 54    Diabetes Neg     Heart Disorder Neg        MEDICATIONS:    Current Outpatient Medications:     Mirabegron ER 50 MG Oral Tablet 24 Hr, Take 1 tablet (50 mg total) by mouth daily.  (Patient not taking: Reported on 11/28/2023), Disp: 30 tablet, Rfl: 11    ALLERGIES:    Allergies   Allergen Reactions    Dairy Products      Sinus congestion         Review of Systems:  Constitutional:  Denies fevers and chills   Cardiovascular:  denies chest pain or palpitations  Respiratory:  denies shortness of breath  Gastrointestinal:  denies heartburn, abdominal pain, diarrhea or constipation  Genitourinary:  denies dysuria, incontinence, abnormal vaginal discharge, vaginal itching  Musculoskeletal:  denies back pain. Skin/Breast:  Denies any breast pain, lumps, or discharge. Neurological:  denies headaches, extremity weakness  Psychiatric: denies depression or anxiety.       Vitals:    11/28/23 1507   BP: 116/74   Pulse: 71       PHYSICAL EXAM:   Constitutional: well developed, well nourished  Head/Face: normocephalic  Neck/Thyroid: thyroid symmetric, no thyromegaly, no nodules, no adenopathy  Heart: Regular rate and rhythm   Lungs: clear to ascultation bilaterally   Lymphatic:no abnormal supraclavicular or axillary adenopathy is noted  Breast: normal without palpable masses, tenderness, asymmetry, nipple discharge, nipple retraction or skin changes  Abdomen:  soft, nontender, nondistended, no masses  Skin/Hair: no unusual rashes or bruises  Extremities: no edema, no cyanosis  Psychiatric: Appropriate mood and affect    Pelvic Exam:  External Genitalia: normal appearance, hair distribution, and no lesions  Urethral Meatus:  normal in size, location, without lesions and prolapse  Bladder:  No fullness, masses or tenderness  Vagina:  Normal appearance without lesions, no abnormal discharge  Cervix:  Normal without tenderness on motion  Uterus: normal in size, contour, position, mobility, without tenderness  Adnexa: normal without masses or tenderness  Perineum: normal    Assessment/Plan:  Charlie Cruz was seen today for gyn exam.    Diagnoses and all orders for this visit:    Well woman exam    History of breast cancer  -     [facility] LAZARA 2D+3D SCREENING BILAT (CPT=77067/75623);  Future    Cervical cancer screening        WWE:   Reviewed ASCCP guidelines with the patient -- Pap today  Contraception: n/a  Encouraged to PCP for routine screening and colonscopy discussion  Breast Health:     Mammo rx'ed  Encouraged monthly self breast exams with the patient   Discussed diet, exercise, MVIs with Vit D  Follow up in 1 yr for [facility]

## 2023-11-29 LAB — HPV I/H RISK 1 DNA SPEC QL NAA+PROBE: NEGATIVE

## 2023-12-23 ENCOUNTER — HOSPITAL ENCOUNTER (OUTPATIENT)
Dept: MAMMOGRAPHY | Age: 63
Discharge: HOME | End: 2023-12-23
Attending: OBSTETRICS & GYNECOLOGY
Payer: COMMERCIAL

## 2023-12-23 DIAGNOSIS — Z85.3 HISTORY OF BREAST CANCER: ICD-10-CM

## 2023-12-23 PROCEDURE — 77067 SCR MAMMO BI INCL CAD: CPT | Performed by: OBSTETRICS & GYNECOLOGY

## 2023-12-23 PROCEDURE — 77063 BREAST TOMOSYNTHESIS BI: CPT | Performed by: OBSTETRICS & GYNECOLOGY

## 2023-12-28 ENCOUNTER — PATIENT MESSAGE (OUTPATIENT)
Dept: OBGYN CLINIC | Facility: CLINIC | Age: 63
End: 2023-12-28

## 2023-12-28 DIAGNOSIS — Z85.3 HX OF BREAST CANCER: Primary | ICD-10-CM

## 2023-12-28 NOTE — TELEPHONE ENCOUNTER
From: Roberto Sampson  To:  Meredith Ardon  Sent: 12/28/2023 11:37 AM CST  Subject: Mammography result     Should I move forward with going for an MRI as recommended in most recent letter regarding my test results

## 2023-12-28 NOTE — TELEPHONE ENCOUNTER
Pt with normal screening mammo on 12/23/23. Mammo results state:  Because of breast density, this patient may benefit from supplemental screening with breast MRI, Molecular Breast Imaging (MBI) or bilateral whole breast ultrasound for increased sensitivity for detection of cancer which can be obscured mammographically. Please contact your ordering provider to discuss supplemental screening options.      To RO- please advise if additional imaging recommended

## 2024-01-09 DIAGNOSIS — N39.41 URGENCY INCONTINENCE: ICD-10-CM

## 2024-03-08 ENCOUNTER — HOSPITAL ENCOUNTER (OUTPATIENT)
Dept: MAMMOGRAPHY | Facility: HOSPITAL | Age: 64
Discharge: HOME | End: 2024-03-08
Attending: OBSTETRICS & GYNECOLOGY
Payer: COMMERCIAL

## 2024-03-08 DIAGNOSIS — Z85.3 HX OF BREAST CANCER: ICD-10-CM

## 2024-03-08 PROCEDURE — 76641 ULTRASOUND BREAST COMPLETE: CPT | Performed by: OBSTETRICS & GYNECOLOGY

## 2024-12-17 ENCOUNTER — OFFICE VISIT (OUTPATIENT)
Dept: OBGYN CLINIC | Facility: CLINIC | Age: 64
End: 2024-12-17
Payer: COMMERCIAL

## 2024-12-17 VITALS
BODY MASS INDEX: 18.5 KG/M2 | HEIGHT: 61.2 IN | DIASTOLIC BLOOD PRESSURE: 73 MMHG | SYSTOLIC BLOOD PRESSURE: 117 MMHG | HEART RATE: 67 BPM | WEIGHT: 98 LBS

## 2024-12-17 DIAGNOSIS — Z01.419 WELL WOMAN EXAM: Primary | ICD-10-CM

## 2024-12-17 DIAGNOSIS — Z12.31 SCREENING MAMMOGRAM, ENCOUNTER FOR: ICD-10-CM

## 2024-12-17 DIAGNOSIS — Z85.3 HX OF BREAST CANCER: ICD-10-CM

## 2024-12-17 PROCEDURE — 3074F SYST BP LT 130 MM HG: CPT | Performed by: OBSTETRICS & GYNECOLOGY

## 2024-12-17 PROCEDURE — 3078F DIAST BP <80 MM HG: CPT | Performed by: OBSTETRICS & GYNECOLOGY

## 2024-12-17 PROCEDURE — 99396 PREV VISIT EST AGE 40-64: CPT | Performed by: OBSTETRICS & GYNECOLOGY

## 2024-12-17 PROCEDURE — 3008F BODY MASS INDEX DOCD: CPT | Performed by: OBSTETRICS & GYNECOLOGY

## 2024-12-17 NOTE — PROGRESS NOTES
Chief Complaint   Patient presents with    Gyn Exam     annual       HPI:  The patient is a 65 yo F here for WWE. NO postmenopausal bleeding.  Having perineal and perianal itching . Having RAMESH symptoms and nocturia.  Saw Urology and wanting 2nd opinion.      No LMP recorded (lmp unknown). Patient is postmenopausal.        Latest Ref Rng & Units 2023     3:45 PM 10/6/2020     1:30 PM 3/16/2017    12:54 PM   RECENT PAP RESULTS   INTERPRETATION/RESULT: Negative for intraepithelial lesion or malignancy Negative for intraepithelial lesion or malignancy  Negative for intraepithelial lesion or malignancy  Negative for intraepithelial lesion or malignancy    HPV Negative Negative  Negative  Negative         Hx Prior Abnormal Pap: No  Pap Date: 23  Pap Result Notes: Normal Pap, neg HPV..      Chaperone: declines      Depression Screening (PHQ-2/PHQ-9): Over the LAST 2 WEEKS   Little interest or pleasure in doing things (over the last two weeks)?: Not at all    Feeling down, depressed, or hopeless (over the last two weeks)?: Not at all    PHQ-2 SCORE: 0          Reviewed medical and surgical history below       OBSTETRICS HISTORY:  OB History    Para Term  AB Living   1 1 1 0 0 2   SAB IAB Ectopic Multiple Live Births   0 0 0 1 2       GYNE HISTORY:  History   Sexual Activity    Sexual activity: Yes    Partners: Male            MEDICAL HISTORY:  Past Medical History:    Breast cancer ([facility])    Gall stones    Urinary incontinence    Agapito-Parkinson-White syndrome       SURGICAL HISTORY:  Past Surgical History:   Procedure Laterality Date    Chemotherapy      chemo, pt tried to take tamoxifen,unable to tolerate    Inguinal hernia repair Bilateral 1963    Lumpectomy left  1-28-15    with SLNB    Needle biopsy left  2014    benign u/s guided    Needle biopsy right  2014    benign MRI BX    Other surgical history      laparascopic bilateral salpinectomy    Pelvis laparoscopy,dx       Radiation left  2015    Tonsillectomy  1965       SOCIAL HISTORY:  Social History     Socioeconomic History    Marital status:      Spouse name: Not on file    Number of children: 2    Years of education: Not on file    Highest education level: Not on file   Occupational History    Occupation: , administrative   Tobacco Use    Smoking status: Never    Smokeless tobacco: Never   Vaping Use    Vaping status: Never Used   Substance and Sexual Activity    Alcohol use: Yes     Alcohol/week: 0.0 standard drinks of alcohol     Comment: 1/week    Drug use: No    Sexual activity: Yes     Partners: Male   Other Topics Concern     Service Not Asked    Blood Transfusions Not Asked    Caffeine Concern Yes     Comment: coffee, 1-2 cups daily    Occupational Exposure Not Asked    Hobby Hazards Not Asked    Sleep Concern Not Asked    Stress Concern Not Asked    Weight Concern Not Asked    Special Diet Not Asked    Back Care Not Asked    Exercise Not Asked    Bike Helmet Not Asked    Seat Belt Not Asked    Self-Exams Not Asked   Social History Narrative    Not on file     Social Drivers of Health     Financial Resource Strain: Not on file   Food Insecurity: Not on file   Transportation Needs: Not on file   Physical Activity: Not on file   Stress: Not on file   Social Connections: Not on file   Housing Stability: Not on file       FAMILY HISTORY:  Family History   Problem Relation Age of Onset    Breast Cancer Mother 70    Cancer Father 62        Prostate CA    Hypertension Father     Cancer Maternal Grandfather 58        colon ca; d.58    Cancer Paternal Grandfather 70        lung ca, smoker; d.75    Cancer Paternal Aunt 32        uterine ca; d.68    Breast Cancer Paternal Aunt         unk age, ovarian and breastca    Ovarian Cancer Paternal Aunt         unk age, ovarian and breast ca    Cancer Paternal Uncle 64        Prostate CA    Breast Cancer Self 54    Diabetes Neg     Heart Disorder Neg         MEDICATIONS:  No current outpatient medications on file.    ALLERGIES:  Allergies[1]      Review of Systems:  Constitutional:  Denies fevers and chills   Cardiovascular:  denies chest pain or palpitations  Respiratory:  denies shortness of breath  Gastrointestinal:  denies heartburn, abdominal pain, diarrhea or constipation  Genitourinary:  denies dysuria, incontinence, abnormal vaginal discharge, vaginal itching  Musculoskeletal:  denies back pain.  Skin/Breast:  Denies any breast pain, lumps, or discharge.   Neurological:  denies headaches, extremity weakness  Psychiatric: denies depression or anxiety.      Vitals:    12/17/24 1504   BP: 117/73   Pulse: 67       PHYSICAL EXAM:   Constitutional: well developed, well nourished  Head/Face: normocephalic  Neck/Thyroid: thyroid symmetric, no thyromegaly, no nodules, no adenopathy  Heart: Regular rate and rhythm   Lungs: clear to ascultation bilaterally   Lymphatic:no abnormal supraclavicular or axillary adenopathy is noted  Breast: normal without palpable masses, tenderness, asymmetry, nipple discharge, nipple retraction or skin changes  Abdomen:  soft, nontender, nondistended, no masses  Skin/Hair: no unusual rashes or bruises  Extremities: no edema, no cyanosis  Psychiatric: Appropriate mood and affect    Pelvic Exam:  External Genitalia: normal appearance, hair distribution, and no lesions  Urethral Meatus:  normal in size, location, without lesions and prolapse  Bladder:  No fullness, masses or tenderness  Vagina:  Normal appearance without lesions, no abnormal discharge  Cervix:  Normal without tenderness on motion  Uterus: normal in size, contour, position, mobility, without tenderness  Adnexa: normal without masses or tenderness  Perineum/perinal dermatitis----patient reports using new larger pads due to incontinence    Assessment/Plan:  Deanna was seen today for gyn exam.    Diagnoses and all orders for this visit:    Well woman exam    Screening mammogram,  encounter for  -     [facility] LAZARA 2D+3D SCREENING BILAT (CPT=77067/10886); Future    Hx of breast cancer  -     [facility] LAZARA 2D+3D SCREENING BILAT (CPT=77067/00394); Future        WWE:   Reviewed ASCCP guidelines with the patient -- Pap UTD  Aquaphor for dermitits and change pads; see Urogyne--Dr Villegas information provided for 2nd opinion  Breast Health:     Mammo rx'ed  Encouraged monthly self breast exams with the patient   Encouraged to follow up with Dr Ascencio per her recs  Discussed diet, exercise, MVIs with Vit D  Follow up in 1 yr for WWE           [1]   Allergies  Allergen Reactions    Dairy Products      Sinus congestion

## 2025-01-11 ENCOUNTER — HOSPITAL ENCOUNTER (OUTPATIENT)
Dept: MAMMOGRAPHY | Age: 65
Discharge: HOME | End: 2025-01-11
Attending: OBSTETRICS & GYNECOLOGY
Payer: COMMERCIAL

## 2025-01-11 DIAGNOSIS — Z85.3 HX OF BREAST CANCER: ICD-10-CM

## 2025-01-11 DIAGNOSIS — Z12.31 SCREENING MAMMOGRAM, ENCOUNTER FOR: ICD-10-CM

## 2025-01-11 PROCEDURE — 77063 BREAST TOMOSYNTHESIS BI: CPT | Performed by: OBSTETRICS & GYNECOLOGY

## 2025-01-11 PROCEDURE — 77067 SCR MAMMO BI INCL CAD: CPT | Performed by: OBSTETRICS & GYNECOLOGY

## 2025-02-05 ENCOUNTER — OFFICE VISIT (OUTPATIENT)
Dept: UROLOGY | Facility: HOSPITAL | Age: 65
End: 2025-02-05
Attending: OBSTETRICS & GYNECOLOGY
Payer: COMMERCIAL

## 2025-02-05 VITALS — HEIGHT: 61.2 IN | WEIGHT: 98 LBS | RESPIRATION RATE: 18 BRPM | BODY MASS INDEX: 18.5 KG/M2

## 2025-02-05 DIAGNOSIS — N39.3 FEMALE STRESS INCONTINENCE: ICD-10-CM

## 2025-02-05 DIAGNOSIS — N95.2 POSTMENOPAUSAL ATROPHIC VAGINITIS: Primary | ICD-10-CM

## 2025-02-05 DIAGNOSIS — N39.41 URGE INCONTINENCE: ICD-10-CM

## 2025-02-05 DIAGNOSIS — R39.15 URINARY URGENCY: ICD-10-CM

## 2025-02-05 DIAGNOSIS — R35.1 NOCTURIA: ICD-10-CM

## 2025-02-05 DIAGNOSIS — N81.84 PELVIC MUSCLE WASTING: ICD-10-CM

## 2025-02-05 LAB
BILIRUB UR QL: NEGATIVE
CLARITY UR: CLEAR
COLOR UR: COLORLESS
CONTROL RUN WITHIN 24 HOURS?: YES
GLUCOSE UR-MCNC: NORMAL MG/DL
KETONES UR-MCNC: NEGATIVE MG/DL
LEUKOCYTE ESTERASE UR QL STRIP.AUTO: 500
NITRITE UR QL STRIP.AUTO: NEGATIVE
NITRITE URINE: NEGATIVE
PH UR: 5.5 [PH] (ref 5–8)
PROT UR-MCNC: NEGATIVE MG/DL
SP GR UR STRIP: 1.01 (ref 1–1.03)
UROBILINOGEN UR STRIP-ACNC: NORMAL

## 2025-02-05 PROCEDURE — 99212 OFFICE O/P EST SF 10 MIN: CPT

## 2025-02-05 PROCEDURE — 81002 URINALYSIS NONAUTO W/O SCOPE: CPT | Performed by: OBSTETRICS & GYNECOLOGY

## 2025-02-05 PROCEDURE — 87086 URINE CULTURE/COLONY COUNT: CPT | Performed by: OBSTETRICS & GYNECOLOGY

## 2025-02-05 PROCEDURE — 81001 URINALYSIS AUTO W/SCOPE: CPT | Performed by: OBSTETRICS & GYNECOLOGY

## 2025-02-05 RX ORDER — ESTRADIOL 0.1 MG/G
CREAM VAGINAL
Qty: 42 G | Refills: 3 | Status: SHIPPED | OUTPATIENT
Start: 2025-02-05

## 2025-02-05 NOTE — PATIENT INSTRUCTIONS
Name Address [facility] Phone/Fax Contact   [facility] [address]  [address]   [facility]   [address] PH: [phone]  FAX: [phone]   Claire Teague     [facility] [address] 2[address]   [facility]   [address] PH: [phone]  FAX: [phone]    [facility]   [address]   [place]   [place]   [address]   PH: [phone]    [facility] [address]   [facility]   [address] PH: [phone]  FAX: [phone]   Beth Wells   [facility]   [address]    [facility]    [address] PH: [phone]  FAX: [phone] Michele Briseno [facility]   [facility] [place]   [address]     [facility]     [address]   PH: [phone]  FAX: [phone]   Josephine Burton   [facility] [address]  [address]   [facility]   [address] PH: [phone]  FAX: [phone]   Uzma Joe   [facility]    [address]   [facility]   [address]   PH: [phone]   Lisset Woods     [facility] [address]   [facility]   [address] PH: [phone]  FAX: [phone] [facility]   [address]   [facility]   [address] PH: [phone]  FAX: [phone]        [facility]   [address]   [facility]   [address] PH: [phone]  FAX: [phone]   Bernadine Bingham   [facility] [address]  [address]   [facility]   [address] PH: [phone]  FAX: [phone]   Eileen Mcneil   [facility]   [address]   [facility]   [address] PH: [phone]  FAX: [phone] Chanel Jefferson  [facility] [address] [place] [address] Dr. Zhao  300   [facility]   [address] PH: [phone]  FAX: [phone] Eileen Mckeon  [facility]   [facility] [address]   [facility]   [address] PH: [phone]  FAX: [phone]   Eleanor Owen     [facility] [address]   [facility]   [address] PH: [phone]  FAX: [phone]   Marybeth Clemons   [facility] [address]   [facility]   [address] PH: [phone]  FAX: [phone]   Alanna Ford     [facility] [address]   [address] PH: [phone]  FAX: [phone]      [facility]   [address] [facility] [address]   [address]   PH: [phone]   Nilda Morgan     [facility] [address] [facility]   [address] PH: [phone]  FAX: [phone]   [facility] [address] [facility]   [address] PH: [phone]  FAX: [phone] Jannette Arthur [facility] [address]  [address]   [facility]   [address]   PH: [phone] Mad[facility] Cash [facility] [address] [facility]   [address] PH: [phone]  FAX: [phone]    [facility]   [address] [facility]   [address] PH: [phone]  FAX: [phone]   Zahira Amaya     [facility] [address] [facility]   [address] PH: [phone]  FAX: [phone]      [facility]   [address] [facility]   [address] PH: [phone]  FAX: [phone]    [facility] [address]  [address]   [facility]   [address] PH: [phone]  FAX: [phone]   Susan Cruz   [facility]   [address] [facility]   [address] PH: [phone]  FAX: [phone]   [facility] [address] [place] [address] [address]. [address]. 110   [facility]    [address] PH: [phone]  FAX: [phone]   Sharmin Oconnell   [facility]   [address]   [place]   [place]   [address] PH: [phone]  FAX: [phone] Roxi Maderafelipealta Stalin   [facility]   [address]   [facility]   [address] PH: [phone]  FAX: [phone]   [facility]   [address]   [facility]   [address] PH: [phone]  FAX: [phone] [facility]     [facility]   [address] [facility]   [address] PH: [phone]  FAX: [phone]      [facility]   [address] [facility]   [address] PH: [phone]  FAX: [phone] Vanesa Cullen [facility]   [address] [facility]   [address]   PH: [phone] Reyna Hull   [facility]   [address]   [facility]   [address] PH: [phone]  FAX: [phone] Bernadine Schmidt [facility]   [address]   [place]   [place]   [address] PH: [phone]  FAX: [phone]   Chari Bates   [facility]   [address]   [facility]   [address]   PH: [phone]    [facility] [address]  Lower Level   [place]   [place]   [address]   PH: [phone]      [facility]   [address] [address]   [place]   [address] PH: [phone]  FAX: [phone]      [facility] [address] [facility]  [address]   [facility]   [address] PH: [phone]  FAX: [phone]   Diana Welch   [facility] [address]   [place]   [address] PH: [phone]  FAX: [phone]   Max Reich     [facility] [address] [place] [address].  [address]   [place]y [place]   [address]   PH: [phone] Dania Rocalife  Physical Therapy [address]   [place]   [address] PH: [phone]  FAX: [phone]   Imani Martinez   [facility]   [address]   [facility]   [address] PH: [phone]  FAX: [phone]   Rafaela Rucker     [facility] [address]  [address]   [facility]   [address] PH:       [facility]   [address] [facility]   [address] PH: [phone]  FAX: [phone]    [facility] [address]. 2nd Floor   [facility]   [address]   PH: [phone]    [facility] [address]  [address]   [facility]   [address] PH: [phone]  FAX: [phone] Iwona Raymundo   [facility]   [address] [facility]   [address] PH: [phone]  FAX: [phone] Josiane Timmons     [facility]   [address] [facility]   [address] PH: [phone]  FAX: [phone]      [facility]   [address]   [place]   [address] PH: [phone]  FAX: [phone]    [facility] [address]  [address]   [place]   [address] PH: [phone]  FAX: [phone] Vanessa Stewartinal     [facility] [address]   [facility]   [address] PH: [phone]  FAX: [phone]   Josiane Jin   [facility] Rehabilitation [address] [place] [address] [address]   [facility]   [address] PH: [phone]  FAX: [phone] Haylee Pollard [facility] [address]  [address]   [facility]   [address] PH: [phone]  FAX: [phone] Misa Urban  [facility]   [address] [place]   [place]   [address] PH: [phone]  FAX: [phone]   [facility] [address] [place] [address] 121   [place]   [place]   [address] PH:  [phone]  FAX: [phone]   Eileen Ying     [facility] [address]  [address]   [facility]   [address]   PH: [phone]   Andria Diaz   [facility] [address]  [address]   [facility]   [address] PH: [phone]  FAX: [phone] Deng Timmons     [facility] [address]   [facility]   [address] PH: [phone]  FAX: [phone]   [facility]   [address] [facility]   [facility]   [address] PH: [phone]  FAX: [phone]   Linsey Tapia     [facility] [address]   [facility]   [address] PH: [phone]  FAX: [phone]    [facility] [address]   [facility]   [address] PH: [phone]  FAX: [phone]      [facility]   [address]   [facility]   [address] PH: [phone]  FAX: [phone]    [facility]   [address]   [facility]   [address] PH: [phone]  FAX: [phone] April Damian     [facility]   [address]   [facility]   [address] PH: [phone]  FAX: [phone]   Timmy Lozano   [facility]   [address]   [facility]   [address] PH: [phone]  FAX: [phone] Misa Judd     [facility]   [address]   [facility]   [address] PH: [phone]  FAX: [phone]      [facility] [address] [facility] [address]   [facility]   [address] PH: [phone]  FAX: [phone] [facility]   [facility] [address] [address] [place] [address]. [address]   [facility]   [address] PH: [phone]  FAX: [phone] Deanna Orellana Emily     [facility]   [address] [facility].   [facility]   [address] PH:  [phone]  FAX: [phone]    [facility] [address]  [address]   [facility]   [address] PH: [phone]  FAX: [phone]   Shayna Bernabe   [facility] [address]   [facility]   [address] PH: [phone]  FAX: [phone]   [facility] [address]  [address]   [facility]   [address]   PH: [phone]      [facility]   [address] [place]   [place]   [address] PH: [phone]  FAX: [phone]   Lorena Michaels   [facility] [address]  [address]   [facility]   [address] PH: [phone]  FAX: [phone] Ynes Vanessa   [facility] [address]   [facility]   [address] PH: [phone]  FAX: [phone]   April Caldwell     [facility] [address]   [facility]   [address] PH: [phone]  FAX: [phone]   Nivia Zambrano   [facility] [address]   [facility]   [address]   PH: [phone]      [facility] [address]   [facility]   [address] PH: [phone]  FAX: [phone]    [facility] [address]  [address]   [facility]    [address] PH: [phone]  FAX: [phone] Ashley Jett   [facility] [address]   [facility]   [address] PH: [phone]  FAX: [phone]   Alyx Galvan   [facility] [address]   [facility]   [address] PH: [phone]  FAX: [phone]   Newton Suazo

## 2025-02-05 NOTE — PROGRESS NOTES
Josiane Villegas,   2025     Referred by Dr. Chamorro  Pt here with self    Chief Complaint   Patient presents with    Incontinence     RAMESH>UUI    Urinary Urgency       HPI:  +RAMESH  +UUI  Nocturia x3  No prolapse sx  No dyspareunia  Reg bowels    PRIOR TREATMENTS:    Kegels    no UTIs  No gross hematuria        Rx'd meds by urology (myrbetriq 50mg daily), didn't take    Vitals:  Resp 18   Ht 61.2\"   Wt 98 lb (44.5 kg)   LMP  (LMP Unknown)   BMI 18.40 kg/m²      HISTORY:  Past Medical History:    Breast cancer (HCC)    Gall stones    Urinary incontinence    Agapito-Parkinson-White syndrome      Past Surgical History:   Procedure Laterality Date    Chemotherapy      chemo, pt tried to take tamoxifen,unable to tolerate    Inguinal hernia repair Bilateral 1963    Lumpectomy left  1-28-15    with SLNB    Needle biopsy left  2014    benign u/s guided    Needle biopsy right  2014    benign MRI BX    Other surgical history      laparascopic bilateral salpinectomy    Pelvis laparoscopy,dx      Radiation left      Tonsillectomy  1965      Family History   Problem Relation Age of Onset    Breast Cancer Mother 70    Cancer Father 62        Prostate CA    Hypertension Father     Cancer Maternal Grandfather 58        colon ca; d.58    Cancer Paternal Grandfather 70        lung ca, smoker; d.75    Cancer Paternal Aunt 32        uterine ca; d.68    Breast Cancer Paternal Aunt         unk age, ovarian and breastca    Ovarian Cancer Paternal Aunt         unk age, ovarian and breast ca    Cancer Paternal Uncle 64        Prostate CA    Breast Cancer Self 54    Diabetes Neg     Heart Disorder Neg       Social History     Socioeconomic History    Marital status:     Number of children: 2   Occupational History    Occupation: , administrative   Tobacco Use    Smoking status: Never    Smokeless tobacco: Never   Vaping Use    Vaping status: Never Used   Substance and Sexual Activity     Alcohol use: Yes     Alcohol/week: 0.0 standard drinks of alcohol     Comment: 1/week    Drug use: No    Sexual activity: Yes     Partners: Male   Other Topics Concern    Caffeine Concern Yes     Comment: coffee, 1-2 cups daily        Allergies:  Allergies[1]    Medications:  Medications Prior to Visit[2]    Urogynecology Summary:  Urogynecology Summary  Prolapse: No  RAMESH: Yes (Reports more bothersome, with laugh, seneze, cough and lift)  Urge Incontinence: Yes  Nocturia Frequency: 3 (3-4x/noc)  Frequency: 1 - 2 hours  Incomplete emptying: Yes (Reports sensation at times)  Constipation: No (Reports daily enzyme)  Wears pad day?: 2  Wears Pad Night?: 1  Activities are limited by UI/POP?: Yes (Limits activity)  Currently Sexually Active: Yes    Review of Systems:    A comprehensive 12 point review of systems was completed.  Pertinent positives noted in the the HPI.  No CP  No SOB    GENERAL EXAM:  GENERAL:  Alert and Oriented, and NAD  HEENT:  Normal, no lesions  LUNGS:  Normal effort  HEART:  RRR  ABDOMEN: soft, no mass, no hernia  EXTREM:  Normal, no edema  SKIN:  Normal, no lesions    PELVIC EXAM:  Ext. Gen: some atrophy, no lesions  Urethra: some atrophy, nontender  Bladder:some fullness, nontender  Vagina: some atrophy  Cervix: no bleeding, no lesions, nontender  Uterus: +mobile  Adnexa:no masses, nontender  Perineum: nontender  Anus: erythema perianal, no obvious hemorrhoids  Rectum: defer    PELVIS FLOOR NEUROMUSCULAR FUNCTION:  Strength:  1 and Unable to hold greater than 3 sec  Perineal Sensation:  Normal      PELVIC SUPPORT:  Burke:  0  Ant:  0  Post:  0  CST:  negative  UVJ: somewhat hypermobile    Pt examined with chaperone, RN    Impression/Plan:    ICD-10-CM    1. Postmenopausal atrophic vaginitis  N95.2 estradiol (ESTRACE) 0.1 MG/GM Vaginal Cream      2. Urinary urgency  R39.15 POCT urinalysis dipstick[00909]     Urinalysis, Routine     Urine Culture, Routine      3. Urge incontinence  N39.41 POCT  urinalysis dipstick[07946]     Urinalysis, Routine     Urine Culture, Routine     PHYSICAL THERAPY - External Location      4. Female stress incontinence  N39.3 POCT urinalysis dipstick[81148]     Urinalysis, Routine     Urine Culture, Routine     PHYSICAL THERAPY - External Location      5. Nocturia  R35.1 POCT urinalysis dipstick[07102]     Urinalysis, Routine     Urine Culture, Routine      6. Pelvic muscle wasting  N81.84 PHYSICAL THERAPY - External Location          Discussion Items:   Urodynamics and cystoscopy for evaluation of LUTS  Behavioral and pharmacologic treatments for OAB  Nonsurgical and surgical treatments for Stress Urinary Incontinence  Pelvic muscle rehabilitation including pelvic floor PT  Topical estrogen therapy for treating UGA  Discussed dietary and behavioral modification, discussed pharmacologic and nonpharmacologic mgmt options for urinary symptoms. Discussed dietary & weight management with potential improvements in symptoms with weight loss.    Discussed mgmt of vulvovaginal atrophy with vaginal estrogen cream. Reviewed associated benefits, risks, alternatives, and goals. Recommend low dose twice weekly mgmt     Discussed management options for RAMESH including expectant management, pelvic floor PT, pessary, and surgery  Discussed risks and benefits associated with each option  Discussed urodynamic testing    Discussed dietary and behavioral modifications for mgmt of urinary symptoms  Discussed weight management and benefits of weight loss on urinary symptoms  Reviewed AUA/SUFU guidelines on mgmt of non-neurogenic OAB  Discussed pharmacologic and nonpharmacologic mgmt options of urinary symptoms - reviewed risks, benefits, alternatives, and goals of treatment  Discussed specific risks related to OAB meds including, but not limited to dry mouth, constipation, blurry vision, cognitive changes, and BP elevation.      Diagnostic Items:  Urine testing    Medications Discussed:  Estrace  Cream  OAB meds    Treatment Plan, Non-surgical:   RN teaching/pt education done  Estrace / Premarin cream  PFPT    Treatment Plan, Surgical:   None    Pt verbalizes understanding of all above discussed information. All questions answered. She agrees to plan    Return in about 3 months (around 5/5/2025) for PT f/u, consider meds if sx persist.    Josiane Villegas, DO, FACOG, FACS      Discussion undertaken in English, info provided      The 21st Century Cures Act makes medical notes like these available to patients in the interest of transparency. However, be advised this is a medical document. It is intended as peer to peer communication. It is written in medical language and may contain abbreviations or verbiage that are unfamiliar. It may appear blunt or direct. Medical documents are intended to carry relevant information, facts as evident, and the clinical opinion of the practitioner.            [1]   Allergies  Allergen Reactions    Dairy Products      Sinus congestion   [2]   No outpatient medications prior to visit.     No facility-administered medications prior to visit.

## 2025-02-18 ENCOUNTER — OFFICE VISIT (OUTPATIENT)
Age: 65
End: 2025-02-18
Attending: INTERNAL MEDICINE
Payer: COMMERCIAL

## 2025-02-18 VITALS
BODY MASS INDEX: 17.19 KG/M2 | WEIGHT: 97 LBS | SYSTOLIC BLOOD PRESSURE: 113 MMHG | TEMPERATURE: 97 F | HEART RATE: 79 BPM | OXYGEN SATURATION: 97 % | HEIGHT: 63 IN | RESPIRATION RATE: 18 BRPM | DIASTOLIC BLOOD PRESSURE: 71 MMHG

## 2025-02-18 DIAGNOSIS — Z85.3 HISTORY OF LEFT BREAST CANCER: Primary | ICD-10-CM

## 2025-02-18 DIAGNOSIS — Z12.31 SCREENING MAMMOGRAM, ENCOUNTER FOR: ICD-10-CM

## 2025-02-18 DIAGNOSIS — Z08 ENCOUNTER FOR FOLLOW-UP SURVEILLANCE OF BREAST CANCER: ICD-10-CM

## 2025-02-18 DIAGNOSIS — Z85.3 ENCOUNTER FOR FOLLOW-UP SURVEILLANCE OF BREAST CANCER: ICD-10-CM

## 2025-02-18 NOTE — PROGRESS NOTES
HPI     Deanna Sampson is a 64 year old female here for follow up of History of left breast cancer    Encounter for follow-up surveillance of breast cancer    Screening mammogram, encounter for.    She was diagnosed L breast cancer. Dr Spivey, the pt's gynecologist palpated an abnormality and requested a mammogram which had a L breast abnormality at approx 4 o'clock position. Pt had an US guided bx on 11/20/14 and pathology revealed adenocarcinoma w/mucinous features, ER 86%, TX 71%, HER 2 1+. Ki67 23%. S/P lumpectomy and SLNB w/Dr. Santamaria on 1/28/15. Completed 4 cycles of TC. Radiation therapy completed 9/4/15.  Tamoxifen less than 6 months. Pt was perimenopausal at time of dx. Stopped due to hot flashes, night sweats and not feeling well.      Last visit in our department was on 3/18/2022.     She has been following with her Gyn for her breast exams and mammograms.  Dr. Chamorro recommended she follow again with Oncology.  She is wondering about recommendation for MRI on the mammogram due to dense breasts.  She was diagnosed with breast cancer at age 54.     Denies breast changes on SBE.  No issues with lymphedema.    She is going to urogyn and doing pelvic floor PT and twice a week estrace vaginal cream.       ECOG PS 0      Review of Systems:   Review of Systems   Constitutional:  Negative for appetite change, fatigue and unexpected weight change.   Respiratory:  Negative for cough and shortness of breath.    Cardiovascular:  Negative for chest pain.   Gastrointestinal:  Negative for abdominal distention and abdominal pain.   Musculoskeletal:  Positive for neck pain (occasionally pain in the neck and improved with going to the chiropractor.). Negative for arthralgias and back pain.        No bone pain.   Neurological:  Negative for dizziness, headaches and numbness.   Hematological:  Negative for adenopathy.   Psychiatric/Behavioral:  Negative for sleep disturbance.            Current Outpatient Medications    Medication Sig Dispense Refill    estradiol (ESTRACE) 0.1 MG/GM Vaginal Cream Apply 1/2 gram vaginally 2 times per week. 42 g 3     Allergies:   Allergies   Allergen Reactions    Dairy Products      Sinus congestion       Past Medical History:    Breast cancer (HCC)    Gall stones    Urinary incontinence    Agapito-Parkinson-White syndrome     Past Surgical History:   Procedure Laterality Date    Chemotherapy  2015    chemo, pt tried to take tamoxifen,unable to tolerate    Inguinal hernia repair Bilateral 1963    Lumpectomy left  1-28-15    with SLNB    Needle biopsy left  11/2014    benign u/s guided    Needle biopsy right  12/2014    benign MRI BX    Other surgical history  1996    laparascopic bilateral salpinectomy    Pelvis laparoscopy,dx  1992    Radiation left  2015    Tonsillectomy  1965     Social History     Socioeconomic History    Marital status:     Number of children: 2   Occupational History    Occupation: , administrative   Tobacco Use    Smoking status: Never    Smokeless tobacco: Never   Vaping Use    Vaping status: Never Used   Substance and Sexual Activity    Alcohol use: Yes     Alcohol/week: 0.0 standard drinks of alcohol     Comment: 1/week    Drug use: No    Sexual activity: Yes     Partners: Male   Other Topics Concern    Caffeine Concern Yes     Comment: coffee, 1-2 cups daily       Family History   Problem Relation Age of Onset    Breast Cancer Mother 70    Cancer Father 62        Prostate CA    Hypertension Father     Cancer Maternal Grandfather 58        colon ca; d.58    Cancer Paternal Grandfather 70        lung ca, smoker; d.75    Cancer Paternal Aunt 32        uterine ca; d.68    Breast Cancer Paternal Aunt         unk age, ovarian and breastca    Ovarian Cancer Paternal Aunt         unk age, ovarian and breast ca    Cancer Paternal Uncle 64        Prostate CA    Breast Cancer Self 54    Diabetes Neg     Heart Disorder Neg          PHYSICAL EXAM:    /71 (BP  Location: Left arm, Patient Position: Sitting, Cuff Size: adult)   Pulse 79   Temp 96.8 °F (36 °C) (Tympanic)   Resp 18   Ht 1.6 m (5' 3\")   Wt 44 kg (97 lb)   LMP  (LMP Unknown)   SpO2 97%   BMI 17.18 kg/m²   Wt Readings from Last 6 Encounters:   02/18/25 44 kg (97 lb)   02/05/25 44.5 kg (98 lb)   12/17/24 44.5 kg (98 lb)   11/28/23 43.1 kg (95 lb)   03/18/22 44.1 kg (97 lb 3.2 oz)   11/03/21 44.9 kg (99 lb)     Physical Exam  General: Patient is alert, not in acute distress.  HEENT: EOMs intact. PERRL.   Neck: No JVD. No palpable lymphadenopathy. Neck is supple.  Chest: Clear to auscultation.  Breasts: R breast no masses.  L breast w/o masses, surgical scars noted.  No lymphedema.   Heart: Regular rate and rhythm.   Abdomen: Soft, non tender with good bowel sounds.  Extremities: No edema.  Neurological: Grossly intact.   Lymphatics: There is no palpable lymphadenopathy throughout in the cervical, supraclavicular, axillary, or inguinal regions.  Psych/Depression: nl        ASSESSMENT/PLAN:     1. History of left breast cancer    2. Encounter for follow-up surveillance of breast cancer    3. Screening mammogram, encounter for        Diagnosed L breast cancer, [id number], stage IIA.     ER 86%, VT 71%, HER 2 1+. Ki67 23%. Pt had lumpectomy and SLNB w/ Dr. Santamaria at the end of January.   Had adjuvant TC x 4.  Tamoxifen x 6 mo until 2015.  Arimidex for a couple of months in 2018.     Surveillance once a year as 10 yrs from completion of treatment with adjuvant endocrine therapy.    Unknown benefit this far out from adjuvant hormonal therapy.    SBE every month.     CBE and HP every year looking for signs or symptoms of recurrence.    Continue screening mammogram yearly.  Most recent mammogram on 1/11/2025 BI-RADS 2, she will be due again for mammogram in January 2026.  She does have dense breasts but getting basim for her screening.  D/w patient that MRI can lead to false positives, will get MRI if needed pending  mammogram.      OK for short term use of esterase cream for urinary incontinence.    No orders of the defined types were placed in this encounter.    MDM low    Results From Past 48 Hours:  No results found for this or any previous visit (from the past 48 hours).    Imaging & Referrals:  None   No orders of the defined types were placed in this encounter.    PROCEDURE:  KOREY LAZARA 2D+3D SCREENING BILAT (CPT=77067/85868)     COMPARISON:  EDCHITO , MG, KOREY LAZARA 2D+3D DIAGNOSTIC KOREY  BILAT (BMW=03037/61933), 10/19/2020, 3:26 PM.  STACY , MG, KOREY LAZARA 2D+3D DIAGNOSTIC KOREY  BILAT (FNE=91115/75517), 11/17/2021, 3:14 PM.  STACY , MG, KOREY LAZARA 2D+3D SCREENING BILAT  (CPT=77067/27609), 12/03/2022, 9:31 AM.  DANIELA MG, KOREY LAZARA 2D+3D SCREENING BILAT (CPT=77067/11473), 12/23/2023, 2:53 PM.     INDICATIONS:  Z85.3 Hx of breast cancer Z12.31 Screening mammogram, encounter for     VIEWS OBTAINED:  Routine views of both breasts were obtained.    Standard 2D and additional multiplane thin sections of the breasts were obtained for the purpose of Tomosynthesis evaluation.  The images were reconstructed and reviewed on the dedicated Tomosynthesis workstation.     BREAST COMPOSITION:   Heterogeneously dense, which may obscure small masses.     FINDINGS:    There are stable benign appearing post lumpectomy changes in the upper outer  left breast.  There are no spiculated masses, areas of nonsurgical distortion, or suspicious grouped calcifications in either breast.  The visualized axilla are unremarkable.                  Impression:    CONCLUSION:    Postlumpectomy changes of the left breast.     No mammographic evidence of malignancy in either breast.     Given the patient's history of breast cancer and dense breast tissue, recommend consideration of annual supplemental screening with MRI. Per the American College of  Radiology, annual screening MRI is recommended for women with a personal history of  breast cancer and dense  tissue OR for those diagnosed with breast cancer under the age of 50 (Gadiel D, Raphael M, Emeterio L, Baileeell BL, Selvin B, Evelio E. Breast cancer screening in women at higher than average risk: recommendations from the American  College of Radiology. JACR. 2017).     BI-RADS CATEGORY:    DIAGNOSTIC CATEGORY 2 - BENIGN FINDING:       RECOMMENDATIONS:    ROUTINE MAMMOGRAM AND CLINICAL EVALUATION IN 12 MONTHS.                   A letter explaining the results in lay terms has been sent to the patient.  This exam was evaluated with a computer-aided device.  This patient's information has been entered into a reminder system with a target due date for the next mammogram.        LOCATION:  [place]        Dictated by (CST): Chari Akers MD on 1/13/2025 at 9:31 AM      Finalized by (CST): Chari Akers MD on 1/13/2025 at 9:33 AM      [facility] [address] [place] [address]  [phone]

## 2025-04-24 ENCOUNTER — TELEPHONE (OUTPATIENT)
Dept: OBGYN CLINIC | Facility: CLINIC | Age: 65
End: 2025-04-24

## 2025-04-24 ENCOUNTER — OFFICE VISIT (OUTPATIENT)
Dept: OBGYN CLINIC | Facility: CLINIC | Age: 65
End: 2025-04-24
Payer: COMMERCIAL

## 2025-04-24 VITALS
HEART RATE: 67 BPM | DIASTOLIC BLOOD PRESSURE: 77 MMHG | WEIGHT: 98 LBS | SYSTOLIC BLOOD PRESSURE: 121 MMHG | BODY MASS INDEX: 17 KG/M2

## 2025-04-24 DIAGNOSIS — L29.3 PERINEAL ITCHING, FEMALE: Primary | ICD-10-CM

## 2025-04-24 PROCEDURE — 3078F DIAST BP <80 MM HG: CPT | Performed by: OBSTETRICS & GYNECOLOGY

## 2025-04-24 PROCEDURE — 3074F SYST BP LT 130 MM HG: CPT | Performed by: OBSTETRICS & GYNECOLOGY

## 2025-04-24 PROCEDURE — 99213 OFFICE O/P EST LOW 20 MIN: CPT | Performed by: OBSTETRICS & GYNECOLOGY

## 2025-04-24 RX ORDER — NYSTATIN AND TRIAMCINOLONE ACETONIDE 100000; 1 [USP'U]/G; MG/G
OINTMENT TOPICAL
Qty: 60 G | Refills: 0 | Status: SHIPPED | OUTPATIENT
Start: 2025-04-24

## 2025-04-24 NOTE — PROGRESS NOTES
The following individual(s) verbally consented to be recorded using ambient AI listening technology and understand that they can each withdraw their consent to this listening technology at any point by asking the clinician to turn off or pause the recording:    Patient name: Deanna RAFAEL Sampson

## 2025-04-24 NOTE — TELEPHONE ENCOUNTER
Patient was seen in December for her annual. At that time she had a rash between her butt cheeks and Dr Chamorro told her to call if it worsened or returned. That rash has returned. It is very itchy and inflamed. Please call to discuss.

## 2025-04-24 NOTE — PROGRESS NOTES
Chief Complaint   Patient presents with    Gyn Problem     Patient states rash on buttocks         HPI:      History of Present Illness  The patient, with a history of urinary incontinence and nocturia, presents with perineal and perianal itching. She reports that the itching started two days ago and has progressively worsened. She denies recent antibiotic use, changes in soaps or detergents, and vaginal discharge. She is sexually active and her partner has not changed soaps or detergents. She has been using Estrace cream twice weekly and has applied Aquaphor and cortisone cream to the area for relief. The cortisone cream provided minimal relief and caused burning.    The patient recently completed two months of physical therapy for urinary incontinence and nocturia. She reports improvement in symptoms, with less frequent nocturia. She continues to perform the exercises at home.  Wearing pantiliner routinely      Chaperone: declines      Depression Screening (PHQ-2/PHQ-9): Over the LAST 2 WEEKS   Little interest or pleasure in doing things (over the last two weeks)?: Not at all    Feeling down, depressed, or hopeless (over the last two weeks)?: Not at all    PHQ-2 SCORE: 0            Reviewed medical and surgical history below       OBSTETRICS HISTORY:  OB History    Para Term  AB Living   1 1 1 0 0 2   SAB IAB Ectopic Multiple Live Births   0 0 0 1 2       GYNE HISTORY:  History   Sexual Activity    Sexual activity: Yes    Partners: Male            MEDICAL HISTORY:  Past Medical History[1]    SURGICAL HISTORY:  Past Surgical History[2]    SOCIAL HISTORY:  Social History     Socioeconomic History    Marital status:      Spouse name: Not on file    Number of children: 2    Years of education: Not on file    Highest education level: Not on file   Occupational History    Occupation: , administrative   Tobacco Use    Smoking status: Never    Smokeless tobacco: Never   Vaping Use     Vaping status: Never Used   Substance and Sexual Activity    Alcohol use: Yes     Alcohol/week: 0.0 standard drinks of alcohol     Comment: 1/week    Drug use: No    Sexual activity: Yes     Partners: Male   Other Topics Concern     Service Not Asked    Blood Transfusions Not Asked    Caffeine Concern Yes     Comment: coffee, 1-2 cups daily    Occupational Exposure Not Asked    Hobby Hazards Not Asked    Sleep Concern Not Asked    Stress Concern Not Asked    Weight Concern Not Asked    Special Diet Not Asked    Back Care Not Asked    Exercise Not Asked    Bike Helmet Not Asked    Seat Belt Not Asked    Self-Exams Not Asked   Social History Narrative    Not on file     Social Drivers of Health     Food Insecurity: Not on file   Transportation Needs: Not on file   Stress: Not on file   Housing Stability: Not on file       FAMILY HISTORY:  Family History[3]    MEDICATIONS:  Medications - Current[4]    ALLERGIES:  Allergies[5]      Review of Systems:  Constitutional:  Denies fevers and chills   Cardiovascular:  denies chest pain or palpitations  Respiratory:  denies shortness of breath  Gastrointestinal:  denies heartburn, abdominal pain, diarrhea or constipation  Genitourinary:  denies dysuria, incontinence, abnormal vaginal discharge, vaginal itching  Musculoskeletal:  denies back pain.  Skin/Breast:  Denies any breast pain, lumps, or discharge.   Neurological:  denies headaches, extremity weakness  Psychiatric: denies depression or anxiety.      Vitals:    04/24/25 1414   BP: 121/77   Pulse: 67       PHYSICAL EXAM:   Constitutional: well developed, well nourished  Head/Face: normocephalic  Psychiatric: Appropriate mood and affect    Pelvic Exam:  External Genitalia: normal appearance, hair distribution, and no lesions  Urethral Meatus:  normal in size, location, without lesions and prolapse  Perineum: white skin discoloration with scratch marks  Assessment/Plan:    Deanna was seen today for gyn  problem.    Diagnoses and all orders for this visit:    Perineal itching, female    Other orders  -     nystatin-triamcinolone 100,000-0.1 Units/g-% External Ointment; Apply BID for up to 3 weeks        Assessment & Plan  Perianal and perineal itching    Acute onset of itching with possible causes including yeast infection and inflammatory vulvar conditions. A vulvar biopsy may be considered if symptoms persist due to the risk of precancerous changes. Perform a vaginal culture to rule out  vulvovaginitis. Prescribe Mycolog cream to be applied twice daily for up to three weeks. Advise using Aquaphor after Mycolog cream is absorbed. Recommend discontinuing panty liners and suggest wearing cotton underwear. Schedule a follow-up if symptoms persist to discuss the potential for a vulvar biopsy.         [1]   Past Medical History:   Breast cancer (HCC)    Gall stones    Urinary incontinence    Agapito-Parkinson-White syndrome   [2]   Past Surgical History:  Procedure Laterality Date    Chemotherapy  2015    chemo, pt tried to take tamoxifen,unable to tolerate    Inguinal hernia repair Bilateral 1963    Lumpectomy left  1-28-15    with SLNB    Needle biopsy left  11/2014    benign u/s guided    Needle biopsy right  12/2014    benign MRI BX    Other surgical history  1996    laparascopic bilateral salpinectomy    Pelvis laparoscopy,dx  1992    Radiation left  2015    Tonsillectomy  1965   [3]   Family History  Problem Relation Age of Onset    Breast Cancer Mother 70    Cancer Father 62        Prostate CA    Hypertension Father     Cancer Maternal Grandfather 58        colon ca; d.58    Cancer Paternal Grandfather 70        lung ca, smoker; d.75    Cancer Paternal Aunt 32        uterine ca; d.68    Breast Cancer Paternal Aunt         unk age, ovarian and breastca    Ovarian Cancer Paternal Aunt         unk age, ovarian and breast ca    Cancer Paternal Uncle 64        Prostate CA    Breast Cancer Self 54    Diabetes Neg      Heart Disorder Neg    [4]   Current Outpatient Medications:     nystatin-triamcinolone 100,000-0.1 Units/g-% External Ointment, Apply BID for up to 3 weeks, Disp: 60 g, Rfl: 0    estradiol (ESTRACE) 0.1 MG/GM Vaginal Cream, Apply 1/2 gram vaginally 2 times per week., Disp: 42 g, Rfl: 3  [5]   Allergies  Allergen Reactions    Dairy Products      Sinus congestion

## 2025-04-24 NOTE — TELEPHONE ENCOUNTER
12/17/2024 \"Perineum/perinal dermatitis----patient reports using new larger pads due to incontinence\" Aquaphor was recommended.     Deanna calling with rash that has returned, \"much worse\". Feels she may have been scratching the area. She states skin burns, itching, redness. No bleeding or oozing of the area. Still uses mini pad only, just finished pelvic floor therapy. Is using Aquaphor She states this rash is worse then it was back in December. She also notes 2 bumps, that are not hemorrhoids. Offered appointment with Dr. Chamorro today at 220 pm, she accepts appointment.

## 2025-04-25 LAB
BV BACTERIA DNA VAG QL NAA+PROBE: POSITIVE
C GLABRATA DNA VAG QL NAA+PROBE: NEGATIVE
C KRUSEI DNA VAG QL NAA+PROBE: NEGATIVE
CANDIDA DNA VAG QL NAA+PROBE: NEGATIVE
T VAGINALIS DNA VAG QL NAA+PROBE: NEGATIVE

## 2025-04-25 RX ORDER — METRONIDAZOLE 500 MG/1
500 TABLET ORAL 2 TIMES DAILY
Qty: 14 TABLET | Refills: 0 | Status: SHIPPED | OUTPATIENT
Start: 2025-04-25 | End: 2025-05-02

## 2025-04-25 NOTE — PROGRESS NOTES
Patient informed of Dr. Chamorro recommendations and verbalized understanding.     Prescription sent. Discussed recommendations for vulvar skin care.

## 2025-05-29 ENCOUNTER — VIRTUAL PHONE E/M (OUTPATIENT)
Dept: UROLOGY | Facility: HOSPITAL | Age: 65
End: 2025-05-29
Attending: PHYSICIAN ASSISTANT
Payer: COMMERCIAL

## 2025-05-29 DIAGNOSIS — N95.2 POSTMENOPAUSAL ATROPHIC VAGINITIS: ICD-10-CM

## 2025-05-29 DIAGNOSIS — N81.84 PELVIC MUSCLE WASTING: ICD-10-CM

## 2025-05-29 DIAGNOSIS — N39.41 URGE INCONTINENCE: Primary | ICD-10-CM

## 2025-05-29 DIAGNOSIS — R35.1 NOCTURIA: ICD-10-CM

## 2025-05-29 RX ORDER — MIRABEGRON 50 MG/1
50 TABLET, FILM COATED, EXTENDED RELEASE ORAL DAILY
Qty: 30 TABLET | Refills: 11 | Status: SHIPPED | OUTPATIENT
Start: 2025-05-29

## 2025-05-29 NOTE — PROGRESS NOTES
This visit is being conducted as a phone (audio only) visit with patient's consent.  Patient declined video visit due to technical capacity.  Patient is in a safe, private environment for televisit, provider located in the office setting.    Visit for f/u of PFPT    Completed 14 sessions, completed 4/2025    Reports +improvement, particularly in nocturia (x0-3, previously 4-5)  Keeping up with home PF exercises    Some RAMESH, less bothersome  Ongoing bothersome UUI, considering meds, worried about SEs    Stopped using vag estrogen last month after being dx with BV (treated with PO flagyl by GYN)    Bowels reg     Denies any current signs or symptoms of UTI      Impression/Plan:    ICD-10-CM    1. Urge incontinence  N39.41 Mirabegron ER (MYRBETRIQ) 50 MG Oral Tablet 24 Hr      2. Nocturia  R35.1 Mirabegron ER (MYRBETRIQ) 50 MG Oral Tablet 24 Hr      3. Pelvic muscle wasting  N81.84       4. Postmenopausal atrophic vaginitis  N95.2           Discussion Items:   Discussed mgmt of vulvovaginal atrophy with vaginal estrogen cream. Reviewed associated benefits, risks, alternatives, and goals. Recommend low dose 2x/week treatment (fingertip application)    Discussed dietary and behavioral modifications for mgmt of urinary symptoms.  Discussed weight management and benefits of weight loss on urinary symptoms.  Reviewed AUA/SUFU guidelines on mgmt of non-neurogenic OAB.  Discussed pharmacologic and nonpharmacologic mgmt options of urinary symptoms - reviewed risks, benefits, alternatives, and goals of treatment.  Discussed specific risks related to OAB meds including, but not limited to dry mouth, constipation, blurry vision, cognitive changes, and BP elevation.      Treatment Plan:  Start mirabegron 50 mg  -- if issues with cost/coverage will try Gemtesa 75 mg with  coupon card  Resume vag estrogen as prescribed  Bowel regimen  Bladder diet/drill  Pelvic floor exercises  Call with s/sx of UTI    All questions  answered  She understands and agrees to plan    Return in about 6 weeks (around 7/10/2025) for UUI (phone).    Imani Troncoso PA-C    A total of 19 minutes were spent in discussion with the patient and/or family on this encounter in addition to time spent on chart review and documentation.    Note to patient: The 21st Century Cures Act makes medical notes like these available to patients in the interest of transparency.  However, be advised this is a medical document.  It is intended as peer to peer communication.  It is written in medical language and may contain abbreviations or verbiage that are unfamiliar.  It may appear blunt or direct.  Medical documents are intended to carry relevant information, facts as evident, and the clinical opinion of the practitioner.

## 2025-05-30 ENCOUNTER — TELEPHONE (OUTPATIENT)
Dept: UROLOGY | Facility: HOSPITAL | Age: 65
End: 2025-05-30

## 2025-05-30 NOTE — TELEPHONE ENCOUNTER
IC from patient stating Mirabegron prescription was too expensive.  We will discuss with pharmacy options to improve costs, and keep pt notified.  Pt verbalizes understanding and agrees to plan.    Addendum:  TC to pharmacist at [facility] in [place], [phone].  States the cost quoted to pt, is her cost after insurance coverage applied.  Cost with [facility] will be $117.62 for 30 tabs, and with [facility] $291.35 for 30 tabs.  Will notify patient and follow.

## 2025-06-05 ENCOUNTER — TELEPHONE (OUTPATIENT)
Dept: UROLOGY | Facility: HOSPITAL | Age: 65
End: 2025-06-05

## 2025-06-05 DIAGNOSIS — N39.41 URGE INCONTINENCE: Primary | ICD-10-CM

## 2025-06-05 RX ORDER — VIBEGRON 75 MG/1
1 TABLET, FILM COATED ORAL DAILY
Qty: 30 TABLET | Refills: 0 | Status: SHIPPED | OUTPATIENT
Start: 2025-06-05 | End: 2025-07-05

## 2025-06-05 NOTE — TELEPHONE ENCOUNTER
TC to patient.  No answer.  Left message with information below.  Discussed with Imani Troncoso.  Okay to prescribe Gemtesa 75 mg po daily, and stop Mirabegron.  Given number to call for Gemtesa support program.  Encouraged to call us with update.  Will follow.

## (undated) NOTE — LETTER
2/10/2022              Daniel Boyle            Dear Israel Nicolas,    If you could call us and let us know if you are going to schedule the appointment with Dr. Con Simental. It had been recommended to follow up with med/onc. Dr. Dennie Bracken wanted you to make an appointment. Please let us know. You can call at [phone]. Thanks.           Sincerely,    Ayana Youssef,   [address] [place] [address], [address]  [phone]

## (undated) NOTE — MR AVS SNAPSHOT
[facility]  [address]  [phone]               Thank you for choosing us for your health care visit with Domenico Mendez MD.  We are glad to serve you and happy to provide you with this summary of Call [phone] for help. [employer] is NOT to be used for urgent needs. For medical emergencies, dial 911.         Educational Information     Healthy Diet and Regular Exercise  [employer] for making healthy food choices  -

## (undated) NOTE — MR AVS SNAPSHOT
After Visit Summary   3/16/2017    Kathryn Contreras    MRN: [id number]           Visit Information        Provider Department Dept Phone    3/16/2017 11:40 AM Herrera Rogers MD [facility] [phone]      Your Vitals Were     BP Pulse Wt             11

## (undated) NOTE — Clinical Note
Ramez - I saw Deanna today with mixed UI. I've recommended vag estrogen & PFPT. I will work to manage her sx. I appreciate the opportunity to participate in her care. Thanks, Josiane